# Patient Record
Sex: FEMALE | Race: ASIAN | NOT HISPANIC OR LATINO | URBAN - METROPOLITAN AREA
[De-identification: names, ages, dates, MRNs, and addresses within clinical notes are randomized per-mention and may not be internally consistent; named-entity substitution may affect disease eponyms.]

---

## 2022-11-01 LAB — PAP RECOMMENDATION EXT: NORMAL

## 2023-07-19 NOTE — PROGRESS NOTES
[facility] Progress Note  7/20/2023          Reason for Visit:      had concerns including Establish Care and Annual Exam.       History of Present Illness:     Pt is here today to establish primary care - she recently moved to the area from [place], [place] and was previously followed at [facility] there - records available in care everywhere.    Endometriosis  Pt has had long history of pains/irregular bleeding related to endometriosis.  Had a previous laproscopy and laparatomy for this.  After her pregnancy, had a hormonal IUD but was still having bleeding, so then they added progesterone.  She began to have breast pain and had imaging (MMG/US) showing 2 areas that they wanted follow up on - and so patient made decision to get the IUD out and get off progesterone.  Her gyn in [place] was suggesting hysterectomy, but patient moved here before that was done.  She wasn't entirely sure if she wanted to go that route anyway.  She is interested in getting established with GYN here to discuss and we will put in referral.    Frequent URI  Pt feels that she has been having frequent Upper respiratory infections since she had COVID in May.  She has a child who is 6 and she is often ill with similar symptoms, so its possible it could be related to having a young school-aged child in household.  She is interested in seeing immunologist to have immune workup.    She is actually currently on antibitoics (amoxicillin) after she had sore throat, ear congestion and was diagnosed with Strep at urgent care (she had swab she says).  She is on day 4 of amoxicillin and hasn't had much improvement.  She did not have fever >1004, cervical lymphadenopathy or pus on tonsils she says.  She did not have a cough.  She is taking tylenol prn.    HM  Tdap in 2017 during pregnancy  She had had covid vaccines but has card at home- advised can send us those dates to add to chart.  She had labs done 9/2022 at  "[facility] which were reviewed  PAP smear 10/31/22      Problem List:     Patient Active Problem List   Diagnosis    Endometriosis         Surgical History:     She has a past surgical history that includes laproscopy; Laparotomy (); and  section.      Family History:      Her family history includes Alzheimer's disease in her maternal grandfather; Brain cancer in her father; Diabetes in her paternal grandmother; Hypertension in her mother; Parkinsonism in her maternal grandfather.      Social History:     She reports that she has never smoked. She has never used smokeless tobacco. She reports current alcohol use. She reports that she does not use drugs.      Medications:       Current Outpatient Medications:     amoxicillin (AMOXIL) 500 MG capsule, Take 500 mg by mouth 2 (two) times daily., Disp: , Rfl:     MULTIVITAMIN ORAL, Take by mouth., Disp: , Rfl:     TUMERIC-GING-OLIVE-OREG-CAPRYL ORAL, Take by mouth., Disp: , Rfl:         Allergies:   She is allergic to sulfa (sulfonamide antibiotics).      Review of Systems:     Review of Systems   Constitutional:  Negative for chills and fever.   HENT:  Negative for ear pain and sore throat.    Respiratory:  Negative for cough, shortness of breath and wheezing.    Cardiovascular:  Negative for chest pain and palpitations.   Gastrointestinal:  Negative for constipation, diarrhea, nausea and vomiting.   Genitourinary:  Negative for dysuria and hematuria.   Musculoskeletal:  Negative for joint swelling and joint deformity.   Neurological:  Negative for dizziness and weakness.         Physical Exam:     Temp:             Blood Pressure:  108/64        Pulse:   81     Respirations:     Weight:   57.8 kg (127 lb 6.8 oz)  Height:   5' 2" (1.575 m)  BMI:     Body mass index is 23.31 kg/m².    Physical Exam  Constitutional:       General: She is not in acute distress.  HENT:      Right Ear: Tympanic membrane normal.      Left Ear: Tympanic membrane normal.      Nose: No " congestion or rhinorrhea.      Mouth/Throat:      Pharynx: No oropharyngeal exudate or posterior oropharyngeal erythema.   Cardiovascular:      Rate and Rhythm: Normal rate and regular rhythm.   Pulmonary:      Effort: Pulmonary effort is normal. No respiratory distress.      Breath sounds: No wheezing.   Abdominal:      Palpations: Abdomen is soft.      Tenderness: There is no abdominal tenderness.   Skin:     General: Skin is warm.   Neurological:      General: No focal deficit present.      Mental Status: She is alert and oriented to person, place, and time.         Labs/Imaging/Testing:     Reviewed labs in Care everywhere from [facility] 9/2022      Assessment and Plan:     1. Well adult exam  Will put in routine labs that she can schedule after 9/8/23    2. History of frequent upper respiratory infection  - Ambulatory referral/consult to Immunology; Future  Suspect the positive strep swab may have been unrelated to symptoms as she has not improved on 3-4 days of antibitoics.  She is also have ear congestion symptoms that make me suspect this is more likely a viral rhinitis.  Encouraged nsaids prn for sore throat and expect to be self-limited    3. Endometriosis  - Ambulatory referral/consult to Gynecology; Future    RTC 12 months or sooner prn       Preston Bains MD  7/20/2023    This note was prepared using voice-recognition software.  Although efforts are made to proofread the note, some errors may persist in the final document.

## 2023-07-20 ENCOUNTER — OFFICE VISIT (OUTPATIENT)
Dept: PRIMARY CARE CLINIC | Facility: CLINIC | Age: 38
End: 2023-07-20
Payer: COMMERCIAL

## 2023-07-20 VITALS
OXYGEN SATURATION: 99 % | DIASTOLIC BLOOD PRESSURE: 64 MMHG | HEART RATE: 81 BPM | BODY MASS INDEX: 23.45 KG/M2 | WEIGHT: 127.44 LBS | HEIGHT: 62 IN | SYSTOLIC BLOOD PRESSURE: 108 MMHG

## 2023-07-20 DIAGNOSIS — N80.9 ENDOMETRIOSIS: ICD-10-CM

## 2023-07-20 DIAGNOSIS — Z87.09 HISTORY OF FREQUENT UPPER RESPIRATORY INFECTION: ICD-10-CM

## 2023-07-20 DIAGNOSIS — Z00.00 WELL ADULT EXAM: Primary | ICD-10-CM

## 2023-07-20 PROCEDURE — 3078F PR MOST RECENT DIASTOLIC BLOOD PRESSURE < 80 MM HG: ICD-10-PCS | Mod: CPTII,S$GLB,, | Performed by: INTERNAL MEDICINE

## 2023-07-20 PROCEDURE — 3078F DIAST BP <80 MM HG: CPT | Mod: CPTII,S$GLB,, | Performed by: INTERNAL MEDICINE

## 2023-07-20 PROCEDURE — 3008F PR BODY MASS INDEX (BMI) DOCUMENTED: ICD-10-PCS | Mod: CPTII,S$GLB,, | Performed by: INTERNAL MEDICINE

## 2023-07-20 PROCEDURE — 99385 PREV VISIT NEW AGE 18-39: CPT | Mod: S$GLB,,, | Performed by: INTERNAL MEDICINE

## 2023-07-20 PROCEDURE — 3074F PR MOST RECENT SYSTOLIC BLOOD PRESSURE < 130 MM HG: ICD-10-PCS | Mod: CPTII,S$GLB,, | Performed by: INTERNAL MEDICINE

## 2023-07-20 PROCEDURE — 99999 PR PBB SHADOW E&M-NEW PATIENT-LVL IV: CPT | Mod: PBBFAC,,, | Performed by: INTERNAL MEDICINE

## 2023-07-20 PROCEDURE — 1159F PR MEDICATION LIST DOCUMENTED IN MEDICAL RECORD: ICD-10-PCS | Mod: CPTII,S$GLB,, | Performed by: INTERNAL MEDICINE

## 2023-07-20 PROCEDURE — 3074F SYST BP LT 130 MM HG: CPT | Mod: CPTII,S$GLB,, | Performed by: INTERNAL MEDICINE

## 2023-07-20 PROCEDURE — 1159F MED LIST DOCD IN RCRD: CPT | Mod: CPTII,S$GLB,, | Performed by: INTERNAL MEDICINE

## 2023-07-20 PROCEDURE — 3008F BODY MASS INDEX DOCD: CPT | Mod: CPTII,S$GLB,, | Performed by: INTERNAL MEDICINE

## 2023-07-20 PROCEDURE — 99999 PR PBB SHADOW E&M-NEW PATIENT-LVL IV: ICD-10-PCS | Mod: PBBFAC,,, | Performed by: INTERNAL MEDICINE

## 2023-07-20 PROCEDURE — 99385 PR PREVENTIVE VISIT,NEW,18-39: ICD-10-PCS | Mod: S$GLB,,, | Performed by: INTERNAL MEDICINE

## 2023-07-20 RX ORDER — AMOXICILLIN 500 MG/1
500 CAPSULE ORAL 2 TIMES DAILY
COMMUNITY
Start: 2023-07-15 | End: 2024-04-02

## 2023-07-21 ENCOUNTER — PATIENT OUTREACH (OUTPATIENT)
Dept: ADMINISTRATIVE | Facility: HOSPITAL | Age: 38
End: 2023-07-21
Payer: COMMERCIAL

## 2023-07-26 ENCOUNTER — LAB VISIT (OUTPATIENT)
Dept: LAB | Facility: HOSPITAL | Age: 38
End: 2023-07-26
Payer: COMMERCIAL

## 2023-07-26 ENCOUNTER — OFFICE VISIT (OUTPATIENT)
Dept: ALLERGY | Facility: CLINIC | Age: 38
End: 2023-07-26
Payer: COMMERCIAL

## 2023-07-26 VITALS
SYSTOLIC BLOOD PRESSURE: 106 MMHG | BODY MASS INDEX: 23.39 KG/M2 | WEIGHT: 127.88 LBS | DIASTOLIC BLOOD PRESSURE: 63 MMHG | HEART RATE: 88 BPM

## 2023-07-26 DIAGNOSIS — K21.9 GASTROESOPHAGEAL REFLUX DISEASE, UNSPECIFIED WHETHER ESOPHAGITIS PRESENT: ICD-10-CM

## 2023-07-26 DIAGNOSIS — J31.0 CHRONIC RHINITIS: ICD-10-CM

## 2023-07-26 DIAGNOSIS — Z87.09 HISTORY OF FREQUENT UPPER RESPIRATORY INFECTION: ICD-10-CM

## 2023-07-26 DIAGNOSIS — J31.0 CHRONIC RHINITIS: Primary | ICD-10-CM

## 2023-07-26 LAB
IGA SERPL-MCNC: <5 MG/DL (ref 40–350)
IGE SERPL-ACNC: 50 IU/ML (ref 0–100)
IGG SERPL-MCNC: 2695 MG/DL (ref 650–1600)
IGM SERPL-MCNC: 117 MG/DL (ref 50–300)

## 2023-07-26 PROCEDURE — 82784 ASSAY IGA/IGD/IGG/IGM EACH: CPT | Mod: 59 | Performed by: ALLERGY & IMMUNOLOGY

## 2023-07-26 PROCEDURE — 3074F SYST BP LT 130 MM HG: CPT | Mod: CPTII,S$GLB,, | Performed by: ALLERGY & IMMUNOLOGY

## 2023-07-26 PROCEDURE — 3078F PR MOST RECENT DIASTOLIC BLOOD PRESSURE < 80 MM HG: ICD-10-PCS | Mod: CPTII,S$GLB,, | Performed by: ALLERGY & IMMUNOLOGY

## 2023-07-26 PROCEDURE — 86003 ALLG SPEC IGE CRUDE XTRC EA: CPT | Mod: 59 | Performed by: ALLERGY & IMMUNOLOGY

## 2023-07-26 PROCEDURE — 3008F BODY MASS INDEX DOCD: CPT | Mod: CPTII,S$GLB,, | Performed by: ALLERGY & IMMUNOLOGY

## 2023-07-26 PROCEDURE — 1160F PR REVIEW ALL MEDS BY PRESCRIBER/CLIN PHARMACIST DOCUMENTED: ICD-10-PCS | Mod: CPTII,S$GLB,, | Performed by: ALLERGY & IMMUNOLOGY

## 2023-07-26 PROCEDURE — 82785 ASSAY OF IGE: CPT | Performed by: ALLERGY & IMMUNOLOGY

## 2023-07-26 PROCEDURE — 86317 IMMUNOASSAY INFECTIOUS AGENT: CPT | Mod: 59 | Performed by: ALLERGY & IMMUNOLOGY

## 2023-07-26 PROCEDURE — 86003 ALLG SPEC IGE CRUDE XTRC EA: CPT | Performed by: ALLERGY & IMMUNOLOGY

## 2023-07-26 PROCEDURE — 1159F MED LIST DOCD IN RCRD: CPT | Mod: CPTII,S$GLB,, | Performed by: ALLERGY & IMMUNOLOGY

## 2023-07-26 PROCEDURE — 99999 PR PBB SHADOW E&M-EST. PATIENT-LVL III: CPT | Mod: PBBFAC,,, | Performed by: ALLERGY & IMMUNOLOGY

## 2023-07-26 PROCEDURE — 82784 ASSAY IGA/IGD/IGG/IGM EACH: CPT | Performed by: ALLERGY & IMMUNOLOGY

## 2023-07-26 PROCEDURE — 99999 PR PBB SHADOW E&M-EST. PATIENT-LVL III: ICD-10-PCS | Mod: PBBFAC,,, | Performed by: ALLERGY & IMMUNOLOGY

## 2023-07-26 PROCEDURE — 3074F PR MOST RECENT SYSTOLIC BLOOD PRESSURE < 130 MM HG: ICD-10-PCS | Mod: CPTII,S$GLB,, | Performed by: ALLERGY & IMMUNOLOGY

## 2023-07-26 PROCEDURE — 3078F DIAST BP <80 MM HG: CPT | Mod: CPTII,S$GLB,, | Performed by: ALLERGY & IMMUNOLOGY

## 2023-07-26 PROCEDURE — 99204 OFFICE O/P NEW MOD 45 MIN: CPT | Mod: S$GLB,,, | Performed by: ALLERGY & IMMUNOLOGY

## 2023-07-26 PROCEDURE — 99204 PR OFFICE/OUTPT VISIT, NEW, LEVL IV, 45-59 MIN: ICD-10-PCS | Mod: S$GLB,,, | Performed by: ALLERGY & IMMUNOLOGY

## 2023-07-26 PROCEDURE — 1159F PR MEDICATION LIST DOCUMENTED IN MEDICAL RECORD: ICD-10-PCS | Mod: CPTII,S$GLB,, | Performed by: ALLERGY & IMMUNOLOGY

## 2023-07-26 PROCEDURE — 1160F RVW MEDS BY RX/DR IN RCRD: CPT | Mod: CPTII,S$GLB,, | Performed by: ALLERGY & IMMUNOLOGY

## 2023-07-26 PROCEDURE — 3008F PR BODY MASS INDEX (BMI) DOCUMENTED: ICD-10-PCS | Mod: CPTII,S$GLB,, | Performed by: ALLERGY & IMMUNOLOGY

## 2023-07-26 RX ORDER — AMOXICILLIN 500 MG
1 CAPSULE ORAL DAILY
COMMUNITY

## 2023-07-26 NOTE — PROGRESS NOTES
Suad Prater is referred by Dr. Preston Bains for a consult regarding recurrent upper respiratory infections.  She is here with her daughter Makenzie Calvillo who is 5 years old.      When she was living in [place] in 2020 she saw Dr. Dubon an Allergist at [facility] who did skin testing. She thinks that they were negative.    She had COVID-19 in April 2022.  Since then she has had recurrent upper respiratory infections every several weeks.     She has itching of her ears with fullness, postnasal drip, and clear to yellow mucus. She has been taking Mucinex as needed.  She has also taken as needed antihistamines and Flonase.      She has had multiple courses of antibiotics. She has been [facility] [place].    She has also had a conjunctivitis.    She had a strep throat several weeks ago with a positive culture. She was treated with antibiotics.     She has increased fatigue.     She does have reflux with coffee, caffeine, spicy food, and tomatoes.  She takes Zantac or Pepto-Bismol. She has taken Nexium.  She has increased stomach cramping, nausea, and bloating.    She has endometriosis.     Her daughter sees Dr. Jackson, an allergist for recurrent rhinitis and a food allergy. She is had positive tests for milk and egg.    Her  has asthma.  He has had positive skin tests for juniper, ragweed, and dust mites.  He also is also allergic to peanuts and ibuprofen.    [facility] PEQ ALLERGY QUESTIONNAIRE LONG 7/22/2023   Head or facial pain: No symptoms   Eyes: No symptoms   Do you have difficulty wearing contacts, if applicable?  No   Ears: Itching, Fullness   Do you have ear infections? No   Do you have ear tubes? No   Did you have ear surgery? No   Nose: No symptoms   Did you have a blocked nose? No   Did you have nasal surgery? No   Has your nose ever been broken? No   Throat: Hoarseness, Frequent clearing, Infections, Reflux/heartburn   Sinuses: Sinus pressure or pain, Sinus  infections   Have you had x-rays done for your sinuses? No   Have you had a CT scan done for your sinuses? No   Lungs: No symptoms   Was your last chest x-ray normal or abnormal, if applicable? Normal   Have you ever has a tuberculosis skin test?  Yes   When did you have a tuberculosis skin test? 2018   Was your tuberculosis skin test positive or negative? Negative   Have you ever had a lung-function test? No   Have you had a flu shot this year? Yes   When was your flu shot? Oct 2022   Have you had the pneumonia vaccine?  No   Do you have any known problems with your immune system? No   Do you suspect you may have problems with your immune system? Yes   Do you have frequent infections? Yes   What type of frequent infection(s) do you have? Upper respiratory   Skin: No symptoms   When did these symptoms first occur? June 2022   Are they getting worse or better? Not applicable   How often do these symptoms occur? Every few months   When do these symptoms occur? Adhoc   Do they occur year round? Yes   If there is any seasonal variation in your symptoms, when are they worse? No   Is there a particular time of the day or night when the symptoms are worse? No   Is there anything you have identified, which can cause symptoms or make them worse? (such as dust, grass, plant or animal products, mold, heat, cold, strong odors, exercise) No   Is there anything you have identified, which can make symptoms better?  No   What medications have you tried in the past to help control these symptoms?  Yes - antihistamine ( spray Flonase, allergraD / Claritin D), Mucinex   Please list all the vitamins or herbal medications you are taking. Evening primrose, tumeric, vitamin D   Have you ever seen an allergist for these symptoms? No   Have you ever had skin tests? Yes   When did you have skin tests? 2021   Have you ever had any other type of allergy testing? Yes   When did you have allergy tests?  2021   Have you ever had allergy shots? No    Do you have food allergies? No   Do you have insect allergies? No   Do you have latex allergies? No   Constitution: Fatigue   Cardiovascular: No symptoms   Gastrointestinal: No symptoms   Genital/ urinary: No symptoms   Musculoskeletal: Joint pain   Endocrine: No symptoms   Hematologic: No symptoms   Please note which family members have allergies or asthma and specify which they have. Maternal family - Sulfur allergies   How long have you lived at your current address? 6 months   Has your residence ever had water or flood damage? No   Is there any evidence of mold in the house? No   Does your house have: Central air conditioning, Humidifier   Does your bedroom have: Carpeting, Ceiling fan   What type of pillow do you have, for example feather, foam and fiberfill?  Foam   Do you have pets? No   Does anyone in the house smoke? 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  The continous cycle of upper respiratory infections started post covid infection April 2022. Since then I have had 4 upper respitory infections requiring antibiotics additional rotation of mucinex and decongestants for reoccurring sinus infections and throat   Did you find this questionnaire helpful in addressing your symptoms?  Yes      Physical Examination:  General: Well-developed, well-nourished, no acute distress.  Head: No sinus tenderness.  Eyes: Conjunctivae:  No bulbar or palpebral conjunctival injection.  Ears: EAC's clear.  TM's clear.  No pre-auricular nodes.  Nose: Nasal Mucosa:  Pink.  Septum: No apparent deviation.  Turbinates:  No significant edema.  Polyps/Mass:  None visible.  Teeth/Gums:  No bleeding noted.  Oropharynx: No exudates.  Neck: Supple without thyromegaly. No cervical lymphadenopathy.    Respiratory/Chest: Effort: Good.  Auscultation:  Clear bilaterally.  Cardiovascular:  No murmur, rubs, or gallop heard.   GI:  Non-tender.  No masses.  No  organomegaly.  Extremities:  No cyanosis, clubbing, or edema.  Skin: Good turgor.  No urticaria or angioedema.  Neuro/Psych: Oriented x 3.    Assessment:  1. Chronic rhinitis, consider allergic.  2. Chronic recurrent upper respiratory infections.   3. GERD.  4. COVID-19 infection April 2020.  5. Endometriosis.    Recommendations:  One. Laboratory as ordered.   2. Continue Mucinex as needed.  3. Continue OTC antihistamines as needed.   4. Consider sinus CT with next upper respiratory infection.  5. Consider skin testing off antihistamines if needed.  6. Obtain records from [facility] and the [facility]gent [facility].  7. Return to clinic in two weeks.

## 2023-07-28 LAB
A ALTERNATA IGE QN: <0.1 KU/L
A FUMIGATUS IGE QN: <0.1 KU/L
BERMUDA GRASS IGE QN: <0.1 KU/L
CAT DANDER IGE QN: <0.1 KU/L
CEDAR IGE QN: <0.1 KU/L
D FARINAE IGE QN: <0.1 KU/L
D PTERONYSS IGE QN: <0.1 KU/L
DEPRECATED A ALTERNATA IGE RAST QL: NORMAL
DEPRECATED A FUMIGATUS IGE RAST QL: NORMAL
DEPRECATED BERMUDA GRASS IGE RAST QL: NORMAL
DEPRECATED CAT DANDER IGE RAST QL: NORMAL
DEPRECATED CEDAR IGE RAST QL: NORMAL
DEPRECATED D FARINAE IGE RAST QL: NORMAL
DEPRECATED D PTERONYSS IGE RAST QL: NORMAL
DEPRECATED DOG DANDER IGE RAST QL: NORMAL
DEPRECATED ELDER IGE RAST QL: NORMAL
DEPRECATED ENGL PLANTAIN IGE RAST QL: NORMAL
DEPRECATED PECAN/HICK TREE IGE RAST QL: NORMAL
DEPRECATED ROACH IGE RAST QL: NORMAL
DEPRECATED TIMOTHY IGE RAST QL: NORMAL
DEPRECATED WEST RAGWEED IGE RAST QL: NORMAL
DEPRECATED WHITE OAK IGE RAST QL: NORMAL
DOG DANDER IGE QN: <0.1 KU/L
ELDER IGE QN: <0.1 KU/L
ENGL PLANTAIN IGE QN: <0.1 KU/L
PECAN/HICK TREE IGE QN: <0.1 KU/L
ROACH IGE QN: <0.1 KU/L
TIMOTHY IGE QN: <0.1 KU/L
WEST RAGWEED IGE QN: <0.1 KU/L
WHITE OAK IGE QN: <0.1 KU/L

## 2023-08-01 LAB
DEPRECATED S PNEUM12 IGG SER-MCNC: 0.4 UG/ML
DEPRECATED S PNEUM23 IGG SER-MCNC: 0.4 UG/ML
DEPRECATED S PNEUM4 IGG SER-MCNC: 0.5 UG/ML
DEPRECATED S PNEUM8 IGG SER-MCNC: 1.1 UG/ML
DEPRECATED S PNEUM9 IGG SER-MCNC: 17.4 UG/ML
S PN DA SERO 19F IGG SER-MCNC: 1.8 UG/ML
S PNEUM DA 1 IGG SER-MCNC: 5.3 UG/ML
S PNEUM DA 14 IGG SER-MCNC: 0.8
S PNEUM DA 18C IGG SER-MCNC: <0.3
S PNEUM DA 3 IGG SER-MCNC: <0.3 UG/ML
S PNEUM DA 5 IGG SER-MCNC: 6.9 UG/ML
S PNEUM DA 6B IGG SER-MCNC: 0.6 UG/ML
S PNEUM DA 7F IGG SER-MCNC: 0.9 UG/ML
S PNEUM DA 9V IGG SER-MCNC: <0.3 UG/ML

## 2023-08-06 NOTE — PROGRESS NOTES
throat  [facility] Progress Note  8/10/2023          Reason for Visit:      had concerns including Sore Throat.       History of Present Illness:     Sore throat  At last visit, we discussed a sore throat that patient had already seen urgent care for.  She was swabbed and positive for strep, and had been on amoxicillin but hadn't had improvement.  At the time we speculated that she might be a carrier of strep and it might not be the cause of her symptoms since she didn't have improvement with abx, and she did not have fever or purulent exudate.      She finished the abx and symptoms persisted. She tried naproxen which did help the pain some, but the sore throat persisted.  She has seen A/I for immune workup because of frequent URI - IgA was low and a few of the pneumoccocal serotypes were negative.  She is following up with them later this month to discuss.    She is using antihistamines (allegra) and flonase already - doesn't feel much rhionrrhea.  She does feel that there is phlegm or something in throat that won't come up -    She does have a history of reflux.  We speculated whether this could be LPR.  She is agreeable to do a trial of nexium to see if that helps her sore throat.      Problem List:     Patient Active Problem List   Diagnosis    Endometriosis         Medications:       Current Outpatient Medications:     estradioL (ESTRACE) 0.01 % (0.1 mg/gram) vaginal cream, Place vaginally., Disp: , Rfl:     MULTIVITAMIN ORAL, Take by mouth., Disp: , Rfl:     omega-3 fatty acids/fish oil (FISH OIL-OMEGA-3 FATTY ACIDS) 300-1,000 mg capsule, Take 1 capsule by mouth once daily., Disp: , Rfl:     TUMERIC-GING-OLIVE-OREG-CAPRYL ORAL, Take by mouth., Disp: , Rfl:     amoxicillin (AMOXIL) 500 MG capsule, Take 500 mg by mouth 2 (two) times daily., Disp: , Rfl:         Review of Systems:     Review of Systems   HENT:  Positive for nasal congestion and sore throat. Negative for drooling, ear  "discharge, ear pain and trouble swallowing.    Respiratory:  Negative for cough, shortness of breath and stridor.    Gastrointestinal:  Negative for abdominal pain, diarrhea and vomiting.   Musculoskeletal:  Negative for neck pain.   Neurological:  Negative for headaches.           Physical Exam:     Temp:             Blood Pressure:  110/68        Pulse:   87     Respirations:  18  Weight:   58 kg (127 lb 13.9 oz)  Height:   5' 2" (1.575 m)  BMI:     Body mass index is 23.39 kg/m².    Physical Exam  Constitutional:       General: She is not in acute distress.  HENT:      Right Ear: Tympanic membrane normal.      Left Ear: Tympanic membrane normal.      Nose: No congestion or rhinorrhea.      Mouth/Throat:      Pharynx: No oropharyngeal exudate or posterior oropharyngeal erythema.   Cardiovascular:      Rate and Rhythm: Normal rate and regular rhythm.   Pulmonary:      Effort: Pulmonary effort is normal. No respiratory distress.      Breath sounds: No wheezing.   Abdominal:      Palpations: Abdomen is soft.      Tenderness: There is no abdominal tenderness.   Lymphadenopathy:      Cervical: No cervical adenopathy.   Skin:     General: Skin is warm.   Neurological:      General: No focal deficit present.      Mental Status: She is alert and oriented to person, place, and time.           Labs/Imaging/Testing:     [id number]  No visits with results within 1 Day(s) from this visit.   Latest known visit with results is:   Lab Visit on 07/26/2023   Component Date Value    D. farinae 07/26/2023 <0.10     D. farinae Class 07/26/2023 CLASS 0     Mite Dust Pteronyssinus * 07/26/2023 <0.10     D. pteronyssinus Class 07/26/2023 CLASS 0     [place] Grass 07/26/2023 <0.10     [place] Grass Class 07/26/2023 CLASS 0     David Grass 07/26/2023 <0.10     David Grass Class 07/26/2023 CLASS 0     [place] IgE 07/26/2023 <0.10     [place] Class 07/26/2023 CLASS 0     Plantain 07/26/2023 <0.10     English Plantain Class 07/26/2023 CLASS 0  "    White Oak(Quercus alba) * 07/26/2023 <0.10     Council, Class 07/26/2023 CLASS 0     Pecan [place] Tree 07/26/2023 <0.10     Pecan, Class 07/26/2023 CLASS 0     [place] IgE 07/26/2023 <0.10     [place]er Class 07/26/2023 CLASS 0     Ragweed, Western IgE 07/26/2023 <0.10     Ragweed, Western, Class 07/26/2023 CLASS 0     Alternaria alternata 07/26/2023 <0.10     Altern. alternata Class 07/26/2023 CLASS 0     Aspergillus Fumigatus IgE 07/26/2023 <0.10     A. fumigatus Class 07/26/2023 CLASS 0     Cat Dander 07/26/2023 <0.10     Cat Epithelium Class 07/26/2023 CLASS 0     Cockroach, IgE 07/26/2023 <0.10     Cockroach, IgE 07/26/2023 CLASS 0     Dog Dander, IgE 07/26/2023 <0.10     Dog Dander Class 07/26/2023 CLASS 0     IgA 07/26/2023 <5 (L)     IgE 07/26/2023 50     IgG 07/26/2023 2695 (H)     IgM 07/26/2023 117     S.pneumoniae Type 1 07/26/2023 5.3     S.pneumoniae Type 3 07/26/2023 <0.3     Strep pneumo Type 4 07/26/2023 0.5     S.pneumoniae Type 5 07/26/2023 6.9     S.pneumoniae Type 8 07/26/2023 1.1     S.pneumoniae Type 9N 07/26/2023 17.4     S.pneumoniae Type 12F 07/26/2023 0.4     Strep pneumo Type 14 07/26/2023 0.8     S.pneumoniae Type 19F 07/26/2023 1.8     S.pneumoniae Type 23F 07/26/2023 0.4     S.pneumoniae Type 6B 07/26/2023 0.6     S.pneumoniae Type 7F 07/26/2023 0.9     S.pneumoniae Type 18C 07/26/2023 <0.3     S.pneumoniae Type 9V Abs 07/26/2023 <0.3            Assessment and Plan:     1. Sore throat  - CULTURE, RESPIRATORY  - THROAT   Will to a throat culture - but I doubt strep based on exam and duration of symptoms and failure to improve on amoxicillin    Try nexium for possible LPR.    Keep follow up with allergist/immunologist to discuss the low IgA.       Preston Bains MD  8/10/2023    This note was prepared using voice-recognition software.  Although efforts are made to proofread the note, some errors may persist in the final document.

## 2023-08-07 ENCOUNTER — TELEPHONE (OUTPATIENT)
Dept: ALLERGY | Facility: CLINIC | Age: 38
End: 2023-08-07
Payer: COMMERCIAL

## 2023-08-07 NOTE — TELEPHONE ENCOUNTER
----- Message from [place] sent at 8/7/2023  3:18 PM CDT -----  Regarding: Missed Call  Contact: pt [phone]  Pt is calling in ref to missed call from Rachelle . She has an appt scheduled on 8/9. Patient Requesting Call Back @ [phone]

## 2023-08-10 ENCOUNTER — OFFICE VISIT (OUTPATIENT)
Dept: PRIMARY CARE CLINIC | Facility: CLINIC | Age: 38
End: 2023-08-10
Payer: COMMERCIAL

## 2023-08-10 VITALS
RESPIRATION RATE: 18 BRPM | BODY MASS INDEX: 23.53 KG/M2 | SYSTOLIC BLOOD PRESSURE: 110 MMHG | HEART RATE: 87 BPM | WEIGHT: 127.88 LBS | HEIGHT: 62 IN | OXYGEN SATURATION: 99 % | DIASTOLIC BLOOD PRESSURE: 68 MMHG

## 2023-08-10 DIAGNOSIS — J02.9 SORE THROAT: Primary | ICD-10-CM

## 2023-08-10 PROCEDURE — 3078F DIAST BP <80 MM HG: CPT | Mod: CPTII,S$GLB,, | Performed by: INTERNAL MEDICINE

## 2023-08-10 PROCEDURE — 1159F PR MEDICATION LIST DOCUMENTED IN MEDICAL RECORD: ICD-10-PCS | Mod: CPTII,S$GLB,, | Performed by: INTERNAL MEDICINE

## 2023-08-10 PROCEDURE — 3074F SYST BP LT 130 MM HG: CPT | Mod: CPTII,S$GLB,, | Performed by: INTERNAL MEDICINE

## 2023-08-10 PROCEDURE — 3008F BODY MASS INDEX DOCD: CPT | Mod: CPTII,S$GLB,, | Performed by: INTERNAL MEDICINE

## 2023-08-10 PROCEDURE — 3008F PR BODY MASS INDEX (BMI) DOCUMENTED: ICD-10-PCS | Mod: CPTII,S$GLB,, | Performed by: INTERNAL MEDICINE

## 2023-08-10 PROCEDURE — 87077 CULTURE AEROBIC IDENTIFY: CPT | Mod: 59 | Performed by: INTERNAL MEDICINE

## 2023-08-10 PROCEDURE — 87070 CULTURE OTHR SPECIMN AEROBIC: CPT | Performed by: INTERNAL MEDICINE

## 2023-08-10 PROCEDURE — 99999 PR PBB SHADOW E&M-EST. PATIENT-LVL III: ICD-10-PCS | Mod: PBBFAC,,, | Performed by: INTERNAL MEDICINE

## 2023-08-10 PROCEDURE — 99214 OFFICE O/P EST MOD 30 MIN: CPT | Mod: S$GLB,,, | Performed by: INTERNAL MEDICINE

## 2023-08-10 PROCEDURE — 99999 PR PBB SHADOW E&M-EST. PATIENT-LVL III: CPT | Mod: PBBFAC,,, | Performed by: INTERNAL MEDICINE

## 2023-08-10 PROCEDURE — 3078F PR MOST RECENT DIASTOLIC BLOOD PRESSURE < 80 MM HG: ICD-10-PCS | Mod: CPTII,S$GLB,, | Performed by: INTERNAL MEDICINE

## 2023-08-10 PROCEDURE — 1159F MED LIST DOCD IN RCRD: CPT | Mod: CPTII,S$GLB,, | Performed by: INTERNAL MEDICINE

## 2023-08-10 PROCEDURE — 3074F PR MOST RECENT SYSTOLIC BLOOD PRESSURE < 130 MM HG: ICD-10-PCS | Mod: CPTII,S$GLB,, | Performed by: INTERNAL MEDICINE

## 2023-08-10 PROCEDURE — 87186 SC STD MICRODIL/AGAR DIL: CPT | Performed by: INTERNAL MEDICINE

## 2023-08-10 PROCEDURE — 99214 PR OFFICE/OUTPT VISIT, EST, LEVL IV, 30-39 MIN: ICD-10-PCS | Mod: S$GLB,,, | Performed by: INTERNAL MEDICINE

## 2023-08-10 RX ORDER — ESTRADIOL 0.1 MG/G
CREAM VAGINAL
COMMUNITY
Start: 2023-08-08 | End: 2024-04-02

## 2023-08-14 ENCOUNTER — PATIENT MESSAGE (OUTPATIENT)
Dept: PRIMARY CARE CLINIC | Facility: CLINIC | Age: 38
End: 2023-08-14
Payer: COMMERCIAL

## 2023-08-14 LAB
BACTERIA THROAT CULT: ABNORMAL
BACTERIA THROAT CULT: ABNORMAL

## 2023-08-14 RX ORDER — DOXYCYCLINE HYCLATE 100 MG
100 TABLET ORAL 2 TIMES DAILY
Qty: 14 TABLET | Refills: 0 | Status: SHIPPED | OUTPATIENT
Start: 2023-08-14 | End: 2024-04-02

## 2023-08-21 ENCOUNTER — TELEPHONE (OUTPATIENT)
Dept: OBSTETRICS AND GYNECOLOGY | Facility: CLINIC | Age: 38
End: 2023-08-21
Payer: COMMERCIAL

## 2023-08-21 ENCOUNTER — PATIENT MESSAGE (OUTPATIENT)
Dept: OBSTETRICS AND GYNECOLOGY | Facility: CLINIC | Age: 38
End: 2023-08-21
Payer: COMMERCIAL

## 2023-08-28 ENCOUNTER — OFFICE VISIT (OUTPATIENT)
Dept: ALLERGY | Facility: CLINIC | Age: 38
End: 2023-08-28
Payer: COMMERCIAL

## 2023-08-28 VITALS
SYSTOLIC BLOOD PRESSURE: 113 MMHG | WEIGHT: 130.06 LBS | BODY MASS INDEX: 23.79 KG/M2 | HEART RATE: 73 BPM | DIASTOLIC BLOOD PRESSURE: 64 MMHG

## 2023-08-28 DIAGNOSIS — D80.2 IGA DEFICIENCY: ICD-10-CM

## 2023-08-28 DIAGNOSIS — K21.9 GASTROESOPHAGEAL REFLUX DISEASE, UNSPECIFIED WHETHER ESOPHAGITIS PRESENT: ICD-10-CM

## 2023-08-28 DIAGNOSIS — J31.0 CHRONIC RHINITIS: ICD-10-CM

## 2023-08-28 DIAGNOSIS — H10.9 CONJUNCTIVITIS OF RIGHT EYE, UNSPECIFIED CONJUNCTIVITIS TYPE: Primary | ICD-10-CM

## 2023-08-28 DIAGNOSIS — Z86.19 FREQUENT INFECTIONS: ICD-10-CM

## 2023-08-28 PROCEDURE — 1160F PR REVIEW ALL MEDS BY PRESCRIBER/CLIN PHARMACIST DOCUMENTED: ICD-10-PCS | Mod: CPTII,S$GLB,, | Performed by: ALLERGY & IMMUNOLOGY

## 2023-08-28 PROCEDURE — 3078F PR MOST RECENT DIASTOLIC BLOOD PRESSURE < 80 MM HG: ICD-10-PCS | Mod: CPTII,S$GLB,, | Performed by: ALLERGY & IMMUNOLOGY

## 2023-08-28 PROCEDURE — 95004 PERQ TESTS W/ALRGNC XTRCS: CPT | Mod: S$GLB,,, | Performed by: ALLERGY & IMMUNOLOGY

## 2023-08-28 PROCEDURE — 99999 PR PBB SHADOW E&M-EST. PATIENT-LVL III: ICD-10-PCS | Mod: PBBFAC,,, | Performed by: ALLERGY & IMMUNOLOGY

## 2023-08-28 PROCEDURE — 90732 PPSV23 VACC 2 YRS+ SUBQ/IM: CPT | Mod: S$GLB,,, | Performed by: ALLERGY & IMMUNOLOGY

## 2023-08-28 PROCEDURE — 3074F SYST BP LT 130 MM HG: CPT | Mod: CPTII,S$GLB,, | Performed by: ALLERGY & IMMUNOLOGY

## 2023-08-28 PROCEDURE — 99214 PR OFFICE/OUTPT VISIT, EST, LEVL IV, 30-39 MIN: ICD-10-PCS | Mod: 25,S$GLB,, | Performed by: ALLERGY & IMMUNOLOGY

## 2023-08-28 PROCEDURE — 90471 IMMUNIZATION ADMIN: CPT | Mod: S$GLB,,, | Performed by: ALLERGY & IMMUNOLOGY

## 2023-08-28 PROCEDURE — 99999 PR PBB SHADOW E&M-EST. PATIENT-LVL III: CPT | Mod: PBBFAC,,, | Performed by: ALLERGY & IMMUNOLOGY

## 2023-08-28 PROCEDURE — 95004 PR ALLERGY SKIN TESTS,ALLERGENS: ICD-10-PCS | Mod: S$GLB,,, | Performed by: ALLERGY & IMMUNOLOGY

## 2023-08-28 PROCEDURE — 1159F PR MEDICATION LIST DOCUMENTED IN MEDICAL RECORD: ICD-10-PCS | Mod: CPTII,S$GLB,, | Performed by: ALLERGY & IMMUNOLOGY

## 2023-08-28 PROCEDURE — 90732 PNEUMOCOCCAL POLYSACCHARIDE VACCINE 23-VALENT =>2YO SQ IM: ICD-10-PCS | Mod: S$GLB,,, | Performed by: ALLERGY & IMMUNOLOGY

## 2023-08-28 PROCEDURE — 1159F MED LIST DOCD IN RCRD: CPT | Mod: CPTII,S$GLB,, | Performed by: ALLERGY & IMMUNOLOGY

## 2023-08-28 PROCEDURE — 3078F DIAST BP <80 MM HG: CPT | Mod: CPTII,S$GLB,, | Performed by: ALLERGY & IMMUNOLOGY

## 2023-08-28 PROCEDURE — 99214 OFFICE O/P EST MOD 30 MIN: CPT | Mod: 25,S$GLB,, | Performed by: ALLERGY & IMMUNOLOGY

## 2023-08-28 PROCEDURE — 3008F PR BODY MASS INDEX (BMI) DOCUMENTED: ICD-10-PCS | Mod: CPTII,S$GLB,, | Performed by: ALLERGY & IMMUNOLOGY

## 2023-08-28 PROCEDURE — 1160F RVW MEDS BY RX/DR IN RCRD: CPT | Mod: CPTII,S$GLB,, | Performed by: ALLERGY & IMMUNOLOGY

## 2023-08-28 PROCEDURE — 3074F PR MOST RECENT SYSTOLIC BLOOD PRESSURE < 130 MM HG: ICD-10-PCS | Mod: CPTII,S$GLB,, | Performed by: ALLERGY & IMMUNOLOGY

## 2023-08-28 PROCEDURE — 3008F BODY MASS INDEX DOCD: CPT | Mod: CPTII,S$GLB,, | Performed by: ALLERGY & IMMUNOLOGY

## 2023-08-28 PROCEDURE — 90471 PNEUMOCOCCAL POLYSACCHARIDE VACCINE 23-VALENT =>2YO SQ IM: ICD-10-PCS | Mod: S$GLB,,, | Performed by: ALLERGY & IMMUNOLOGY

## 2023-08-28 NOTE — PROGRESS NOTES
Suad Prater returns to clinic today for continued evaluation of recurrent upper respiratory infections. She is here alone.  She was last seen 2023.    After her last visit, her daughter got pink dye.  Her  then got it. Then she got it.  She was seen in an urgent care center and given eyedrops with improvement.     This morning she woke up with another red eye on the right.    She has not had any other upper respiratory infections.     She has had strep infections in the past.    She continues to have indigestion and heartburn.        8/3/2023     9:46 AM   [facility] PEQ ALLERGY QUESTIONNAIRE SHORT   facial swelling No   Sinus pain? No   sinus pressure  No   ear discharge No   ear pain Yes   hearing loss No   nosebleeds No   postnasal drip No   sneezing No   runny nose Yes   congestion No   sore throat Yes   trouble swallowing No   voice change Yes   eye itching No   eye redness Yes   eye discharge Yes   eye pain No   Light sensitivity / light hurts the eyes? No   cough Yes   wheezing No   shortness of breath No   apnea No   choking No   chest tightness Yes   rash No   color change  No     Physical Examination:  General: Well-developed, well-nourished, no acute distress.  Head: No sinus tenderness.  Eyes: Conjunctivae:  Right conjunctiva injected.  Ears: EAC's clear.  TM's clear.  No pre-auricular nodes.  Nose: Nasal Mucosa:  Pink.  Septum: No apparent deviation.  Turbinates:  No significant edema.  Polyps/Mass:  None visible.  Teeth/Gums:  No bleeding noted.  Oropharynx: No exudates.  Neck: Supple without thyromegaly. No cervical lymphadenopathy.    Respiratory/Chest: Effort: Good.  Auscultation:  Clear bilaterally.  Skin: Good turgor.  No urticaria or angioedema.  Neuro/Psych: Oriented x 3.    Laboratory 2023:  IgE level:  50.   ImmunoCAP:  Negative.   IgA:  Less than 5.   Ig.   IgM:  117.   Pneumococcal titers:  Not protective.    Inhalant skin tests 2023: 3+ histamine control. All  tests are negative.    Assessment:  1. Chronic rhinitis, not allergic.  2. Chronic recurrent upper respiratory infections with low pneumococcal titer.  3. GERD.  4. COVID-19 infection April 2020.  5. Endometriosis.  6. IgA deficiency.   7. Acute right conjunctivitis.    Recommendations:  1. Pneumovax today.   2. Recheck titers in six weeks.   3. Consider sinus CT with next upper respiratory infection.  4. Optometry evaluation.  5. Records from [place] and the Urgent Care Center have not yet been received.  6. Return to clinic with next infection.    Patient education August 28, 2023:  Counseled on IgA deficiency.    I spent a total of 35 minutes on the day of the visit.This includes face to face time and non-face to face time preparing to see the patient (eg, review of tests), obtaining and/or reviewing separately obtained history, documenting clinical information in the electronic or other health record, independently interpreting results and communicating results to the patient/family/caregiver, or care coordinator. 35

## 2023-09-08 ENCOUNTER — OFFICE VISIT (OUTPATIENT)
Dept: OPTOMETRY | Facility: CLINIC | Age: 38
End: 2023-09-08
Payer: COMMERCIAL

## 2023-09-08 DIAGNOSIS — H10.9 CONJUNCTIVITIS OF RIGHT EYE, UNSPECIFIED CONJUNCTIVITIS TYPE: ICD-10-CM

## 2023-09-08 DIAGNOSIS — H16.223 KERATOCONJUNCTIVITIS SICCA NOT SPECIFIED AS SJOGREN'S, BILATERAL: Primary | ICD-10-CM

## 2023-09-08 PROCEDURE — 92012 PR EYE EXAM, EST PATIENT,INTERMED: ICD-10-PCS | Mod: S$GLB,,, | Performed by: OPTOMETRIST

## 2023-09-08 PROCEDURE — 1159F PR MEDICATION LIST DOCUMENTED IN MEDICAL RECORD: ICD-10-PCS | Mod: CPTII,S$GLB,, | Performed by: OPTOMETRIST

## 2023-09-08 PROCEDURE — 92012 INTRM OPH EXAM EST PATIENT: CPT | Mod: S$GLB,,, | Performed by: OPTOMETRIST

## 2023-09-08 PROCEDURE — 1159F MED LIST DOCD IN RCRD: CPT | Mod: CPTII,S$GLB,, | Performed by: OPTOMETRIST

## 2023-09-08 PROCEDURE — 99999 PR PBB SHADOW E&M-EST. PATIENT-LVL III: ICD-10-PCS | Mod: PBBFAC,,, | Performed by: OPTOMETRIST

## 2023-09-08 PROCEDURE — 99999 PR PBB SHADOW E&M-EST. PATIENT-LVL III: CPT | Mod: PBBFAC,,, | Performed by: OPTOMETRIST

## 2023-09-08 RX ORDER — FLUOROMETHOLONE 1 MG/ML
1 SUSPENSION/ DROPS OPHTHALMIC 4 TIMES DAILY
Qty: 5 ML | Refills: 0 | Status: SHIPPED | OUTPATIENT
Start: 2023-09-08 | End: 2023-09-13 | Stop reason: RX

## 2023-09-08 NOTE — PROGRESS NOTES
HPI    Pt is here today due to conjunctivitis OU. Pt states that the recurring   conjunctivitis started the last week of July with her daughter having it.   She states that she got it next and went to urgent care where she was   prescribed drops. She states that she used those drops for another 10 days   which cleared it up. She states that it happened again twice after the   first time and that she used the drops each time to help which cleared it   up temporarily. She states that she replaced all of her makeup products   and contacts. No itching. No pain. Happens OU, but more so OS than OD.     Eye Meds: Ciplox-D BID  Last edited by Dilma Alicia, OD on 9/8/2023  4:05 PM.            Assessment /Plan     For exam results, see Encounter Report.    Keratoconjunctivitis sicca not specified as Sjogren's, bilateral    Conjunctivitis of right eye, unspecified conjunctivitis type  -     Ambulatory referral/consult to Optometry    Other orders  -     fluorometholone 0.1% (FML) 0.1 % DrpS; Place 1 drop into both eyes 4 (four) times daily. for 7 days  Dispense: 5 mL; Refill: 0      Educated pt on findings. Dry eyes OD>OS today. Rx FML 1 gtt OU BID for 7 days then d/c use. After d/c FML, start ATs QID + add belle/gel QHS. Also recommend CL holiday for 1 week until Fml is stopped. D/c ciplox-D gtt. Discussed may need to switch from biweekly Cls to dailies. If symptoms worsen or don't improve, RTC.       RTC with any worsening.     Addendum 09/13/2023  FML gtt out of stock. Will try lotemax instead. Rx lotemax 1 gtt OU QID for 1 week then d/c use. After stopping lotemax, start ATs QID + add belle/gel QHS.

## 2023-09-13 ENCOUNTER — PATIENT MESSAGE (OUTPATIENT)
Dept: OPTOMETRY | Facility: CLINIC | Age: 38
End: 2023-09-13
Payer: COMMERCIAL

## 2023-09-13 RX ORDER — LOTEPREDNOL ETABONATE 5 MG/ML
1 SUSPENSION/ DROPS OPHTHALMIC 4 TIMES DAILY
Qty: 5 ML | Refills: 0 | Status: SHIPPED | OUTPATIENT
Start: 2023-09-13 | End: 2023-09-20

## 2023-09-25 ENCOUNTER — LAB VISIT (OUTPATIENT)
Dept: LAB | Facility: HOSPITAL | Age: 38
End: 2023-09-25
Attending: ALLERGY & IMMUNOLOGY
Payer: COMMERCIAL

## 2023-09-25 DIAGNOSIS — Z86.19 FREQUENT INFECTIONS: ICD-10-CM

## 2023-09-25 PROCEDURE — 86317 IMMUNOASSAY INFECTIOUS AGENT: CPT | Performed by: ALLERGY & IMMUNOLOGY

## 2023-09-25 PROCEDURE — 36415 COLL VENOUS BLD VENIPUNCTURE: CPT | Performed by: ALLERGY & IMMUNOLOGY

## 2023-10-03 LAB
DEPRECATED S PNEUM12 IGG SER-MCNC: >30 UG/ML
DEPRECATED S PNEUM23 IGG SER-MCNC: 1.9 UG/ML
DEPRECATED S PNEUM4 IGG SER-MCNC: >28 UG/ML
DEPRECATED S PNEUM8 IGG SER-MCNC: >86 UG/ML
DEPRECATED S PNEUM9 IGG SER-MCNC: 15.4 UG/ML
S PN DA SERO 19F IGG SER-MCNC: 74.2 UG/ML
S PNEUM DA 1 IGG SER-MCNC: 83.5 UG/ML
S PNEUM DA 14 IGG SER-MCNC: 172.6
S PNEUM DA 18C IGG SER-MCNC: 1.6
S PNEUM DA 3 IGG SER-MCNC: 2.7 UG/ML
S PNEUM DA 5 IGG SER-MCNC: >154 UG/ML
S PNEUM DA 6B IGG SER-MCNC: 32.4 UG/ML
S PNEUM DA 7F IGG SER-MCNC: 12.8 UG/ML
S PNEUM DA 9V IGG SER-MCNC: 2.3 UG/ML

## 2023-11-15 DIAGNOSIS — Z12.31 OTHER SCREENING MAMMOGRAM: ICD-10-CM

## 2023-11-17 DIAGNOSIS — Z12.31 SCREENING MAMMOGRAM FOR BREAST CANCER: Primary | ICD-10-CM

## 2023-11-30 ENCOUNTER — HOSPITAL ENCOUNTER (OUTPATIENT)
Dept: RADIOLOGY | Facility: HOSPITAL | Age: 38
Discharge: HOME | End: 2023-11-30
Attending: INTERNAL MEDICINE
Payer: COMMERCIAL

## 2023-11-30 VITALS — BODY MASS INDEX: 23.78 KG/M2 | WEIGHT: 130 LBS

## 2023-11-30 DIAGNOSIS — Z12.31 OTHER SCREENING MAMMOGRAM: ICD-10-CM

## 2023-11-30 PROCEDURE — 77067 MAMMO DIGITAL SCREENING BILAT WITH TOMO: ICD-10-PCS | Mod: 26,,, | Performed by: RADIOLOGY

## 2023-11-30 PROCEDURE — 77063 MAMMO DIGITAL SCREENING BILAT WITH TOMO: ICD-10-PCS | Mod: 26,,, | Performed by: RADIOLOGY

## 2023-11-30 PROCEDURE — 77067 SCR MAMMO BI INCL CAD: CPT | Mod: TC

## 2023-11-30 PROCEDURE — 77067 SCR MAMMO BI INCL CAD: CPT | Mod: 26,,, | Performed by: RADIOLOGY

## 2023-11-30 PROCEDURE — 77063 BREAST TOMOSYNTHESIS BI: CPT | Mod: 26,,, | Performed by: RADIOLOGY

## 2024-04-02 ENCOUNTER — TELEPHONE (OUTPATIENT)
Dept: PRIMARY CARE CLINIC | Facility: CLINIC | Age: 39
End: 2024-04-02

## 2024-04-02 ENCOUNTER — OFFICE VISIT (OUTPATIENT)
Dept: PRIMARY CARE CLINIC | Facility: CLINIC | Age: 39
End: 2024-04-02
Payer: COMMERCIAL

## 2024-04-02 VITALS
HEIGHT: 62 IN | SYSTOLIC BLOOD PRESSURE: 118 MMHG | DIASTOLIC BLOOD PRESSURE: 82 MMHG | BODY MASS INDEX: 24.01 KG/M2 | WEIGHT: 130.5 LBS | OXYGEN SATURATION: 99 % | HEART RATE: 84 BPM

## 2024-04-02 DIAGNOSIS — M79.18 PAIN OF LEFT DELTOID: Primary | ICD-10-CM

## 2024-04-02 DIAGNOSIS — R05.8 POST-VIRAL COUGH SYNDROME: ICD-10-CM

## 2024-04-02 PROCEDURE — 1159F MED LIST DOCD IN RCRD: CPT | Mod: CPTII,S$GLB,, | Performed by: INTERNAL MEDICINE

## 2024-04-02 PROCEDURE — 3079F DIAST BP 80-89 MM HG: CPT | Mod: CPTII,S$GLB,, | Performed by: INTERNAL MEDICINE

## 2024-04-02 PROCEDURE — 3008F BODY MASS INDEX DOCD: CPT | Mod: CPTII,S$GLB,, | Performed by: INTERNAL MEDICINE

## 2024-04-02 PROCEDURE — 99214 OFFICE O/P EST MOD 30 MIN: CPT | Mod: S$GLB,,, | Performed by: INTERNAL MEDICINE

## 2024-04-02 PROCEDURE — 3074F SYST BP LT 130 MM HG: CPT | Mod: CPTII,S$GLB,, | Performed by: INTERNAL MEDICINE

## 2024-04-02 PROCEDURE — 99999 PR PBB SHADOW E&M-EST. PATIENT-LVL III: CPT | Mod: PBBFAC,,, | Performed by: INTERNAL MEDICINE

## 2024-04-02 RX ORDER — TAZAROTENE 0.45 MG/G
LOTION TOPICAL
COMMUNITY
Start: 2024-01-04

## 2024-04-02 RX ORDER — CEFDINIR 300 MG/1
300 CAPSULE ORAL EVERY 12 HOURS
COMMUNITY
Start: 2024-03-26

## 2024-04-02 RX ORDER — SPIRONOLACTONE 100 MG/1
100 TABLET, FILM COATED ORAL
COMMUNITY
Start: 2023-11-28

## 2024-04-02 NOTE — PROGRESS NOTES
[facility] Progress Note      Chief Complaint     Abscess (On arm inside left arm)   Lump on left deltoid x  8 mo    Subjective:      History of Present Illness:  Suad Prater is a 38 y.o. female     C/o lump x 8 mo on left deltoid approx 3/10  pain at its worst but pt admits to lying on left side while sleeping that exacerbates the pain. She did take Motrin 200mg daily x 3 days at night which did improve the pain. Denies trauma and has FROM but feels tense when lifting above the horizontal.     Post-viral cough x 4-6 wks: her daughter had gotten sick 4 wks ago and pt states she is intermittently coughing but is otherwise afebrile and feels well without fever, chills, N/V, decreased appetite or weight loss and can do all ADLs.     Past Medical History:  No past medical history on file.    Past Surgical History:  Past Surgical History:   Procedure Laterality Date     SECTION      LAPAROTOMY      endometriosis    laproscopy      endometriosis        Allergies:  Review of patient's allergies indicates:   Allergen Reactions    Sulfa (sulfonamide antibiotics) Rash and Swelling       Home Medications:  Current Outpatient Medications   Medication Sig Dispense Refill    ARAZLO 0.045 % Lotn SMARTSIG:Sparingly Topical Every Night      cefdinir (OMNICEF) 300 MG capsule Take 300 mg by mouth every 12 (twelve) hours.      MULTIVITAMIN ORAL Take by mouth.      omega-3 fatty acids/fish oil (FISH OIL-OMEGA-3 FATTY ACIDS) 300-1,000 mg capsule Take 1 capsule by mouth once daily.      spironolactone (ALDACTONE) 100 MG tablet Take 100 mg by mouth.      TUMERIC-GING-OLIVE-OREG-CAPRYL ORAL Take by mouth.       No current facility-administered medications for this visit.        Family History:  Family History   Problem Relation Age of Onset    Hypertension Mother     Brain cancer Father     Alzheimer's disease Maternal Grandfather     Parkinsonism Maternal Grandfather     Diabetes  "Paternal Grandmother        Social History:  Social History     Tobacco Use    Smoking status: Never    Smokeless tobacco: Never   Substance Use Topics    Alcohol use: Yes     Comment: 2-3 drinks per week    Drug use: Never       Review of Systems:  Pertinent positives and negatives listed in HPI. All other systems are reviewed and are negative.    Health Maintaince :   Health Maintenance Topics with due status: Not Due       Topic Last Completion Date    Cervical Cancer Screening 11/01/2022    Pneumococcal Vaccines (Age 0-64) 08/28/2023    Mammogram 11/30/2023           Eye:   Dental:     Immunizations:     The ASCVD Risk score (Camelia ROQUE, et al., 2019) failed to calculate for the following reasons:    Cannot find a previous HDL lab    Cannot find a previous total cholesterol lab      Objective:   /82 (BP Location: Right arm, Patient Position: Sitting, BP Method: Medium (Manual))   Pulse 84   Ht 5' 2" (1.575 m)   Wt 59.2 kg (130 lb 8.2 oz)   LMP 03/31/2024   SpO2 99%   BMI 23.87 kg/m²      Body mass index is 23.87 kg/m².       Physical Examination:  General: Alert and awake in no apparent distress  Neck:   Cervical nodes not enlarged; supple; no bruits  Cardio:  Regular rate and rhythm with normal S1 and S2; no murmurs or rubs  Resp:  CTAB; respirations unlabored; no wheezes, crackles or rhonchi; no cough with deep inspiration   Abdom: Soft, NTND with normoactive bowel sounds; negative HSM  Extrem: Warm and well-perfused with no clubbing, cyanosis or edema; left prox deltoid slightly tender,  more prominent without fluctuance; no palpable discreet mass, no overlying erythema, but  normal ROM and strength; no axillary lymph nodes   Neuro:  AAOx3; cooperative and pleasant with no focal deficits    Laboratory:      Most Recent Data:  No results found for: "WBC", "HGB", "HCT", "PLT", "CHOL", "TRIG", "HDL", "LDLDIRECT", "ALT", "AST", "NA", "K", "CL", "BUN", "CO2", "TSH", "PSA", "INR", "GLUF", "HGBA1C", " ""MICROALBUR"           CBC: No results found for: "WBC", "HGB", "HCT", "PLT", "MCV", "RDW"  BMP: No results found for: "NA", "K", "CL", "CO2", "BUN", "CREATININE", "GLU", "CALCIUM", "MG", "PHOS"  LFTs: No results found for: "PROT", "ALBUMIN", "BILITOT", "AST", "ALKPHOS", "ALT", "GGT"  Coags: No results found for: "INR", "PROTIME", "PTT"  FLP:    No results found for: "CHOL"  No results found for: "HDL"  No results found for: "LDLCALC"  No results found for: "TRIG"  No results found for: "CHOLHDL"   DM:    No results found for: "HGBA1C", "GLUF", "MICROALBUR", "LDLCALC", "CREATININE"  Thyroid: No results found for: "TSH", "FREET4", "F3SDNWF", "Q4FDOHV", "THYROIDAB"  Anemia: No results found for: "IRON", "TIBC", "FERRITIN", "FAGTAMKO55", "FOLATE"  Cardiac: No results found for: "TROPONINI", "CKTOTAL", "CKMB", "BNP"  Urinalysis: No results found for: "LABURIN", "COLORU", "PHUA", "CLARITYU", "SPECGRAV", "LABSPEC", "NITRITE", "PROTEINUR", "GLUCOSEU", "KETONESU", "UROBILINOGEN", "BILIRUBINUR", "BLOODU", "RBCU", "WBCUA"    Other Results:  EKG (my interpretation):     Radiology:  Mammo Digital Screening Bilat w/ Jesus  Narrative: Result:  Mammo Digital Screening Bilat w/ Jesus    History:  Patient is 38 y.o. and is seen for a screening mammogram.    Films Compared:  Prior images (if available) were compared.     Findings:  This procedure was performed using tomosynthesis.   Computer-aided detection was utilized in the interpretation of this   examination.    The breasts are heterogeneously dense, which may obscure small masses.   There is no evidence of suspicious masses, microcalcifications or   architectural distortion.  Impression:    No mammographic evidence of malignancy.    BI-RADS Category 1: Negative    Recommendation:  Routine screening mammogram in 1 year is recommended.    Your estimated lifetime risk of breast cancer (to age 85) based on   Tyrer-Cuzick risk assessment model is 12.34 %.  According to the American "   Cancer Society, patients with a lifetime breast cancer risk of 20% or   higher might benefit from supplemental screening tests, such as screening   breast MRI.          Assessment/Plan     Suad Prater is a 38 y.o. female with:  1. Pain of left deltoid  Assessment & Plan:  Ultrasound left deltoid  Can use Tylenol ES 2 tabs po bid prn or apply aspercreme or Salon pas patches  Avoid sleeping on left side       2. Post-viral cough syndrome  Assessment & Plan:  Suck on Vit C lozenges, hydrate well, drink tea with honey.   Pt reassured of normal course of viral illnesses  Get immunizations to prevent disease  RTC if worsens                I spent a total of 27 minutes on the day of the visit.This includes face to face time and non-face to face time preparing to see the patient (eg, review of tests), obtaining and/or reviewing separately obtained history, documenting clinical information in the electronic or other health record, independently interpreting results and communicating results to the patient/family/caregiver, or care coordinator.   Code Status:     Sheila Hansen MD

## 2024-04-02 NOTE — ASSESSMENT & PLAN NOTE
Ultrasound left deltoid  Can use Tylenol ES 2 tabs po bid prn or apply aspercreme or Salon pas patches  Avoid sleeping on left side

## 2024-04-02 NOTE — ASSESSMENT & PLAN NOTE
Suck on Vit C lozenges, hydrate well, drink tea with honey.   Pt reassured of normal course of viral illnesses  Get immunizations to prevent disease  RTC if worsens

## 2024-04-02 NOTE — PATIENT INSTRUCTIONS
Ultrasound left deltoid  Can use Tylenol ES 2 tabs po bid prn or apply aspercreme or Salon pas patches  Avoid sleeping on left side     Suck on Vit C lozenges, hydrate

## 2024-04-23 ENCOUNTER — HOSPITAL ENCOUNTER (OUTPATIENT)
Dept: RADIOLOGY | Facility: HOSPITAL | Age: 39
Discharge: HOME | End: 2024-04-23
Attending: INTERNAL MEDICINE
Payer: COMMERCIAL

## 2024-04-23 DIAGNOSIS — M79.18 PAIN OF LEFT DELTOID: ICD-10-CM

## 2024-04-23 PROCEDURE — 76882 US LMTD JT/FCL EVL NVASC XTR: CPT | Mod: 26,LT,, | Performed by: RADIOLOGY

## 2024-04-23 PROCEDURE — 76882 US LMTD JT/FCL EVL NVASC XTR: CPT | Mod: TC,LT

## 2024-07-30 ENCOUNTER — PATIENT MESSAGE (OUTPATIENT)
Dept: PRIMARY CARE CLINIC | Facility: CLINIC | Age: 39
End: 2024-07-30
Payer: COMMERCIAL

## 2024-07-31 NOTE — TELEPHONE ENCOUNTER
Pt c/o diarrhea and abd pain and bloating for past 5 days after returning home from [place]. Please assist in scheduling. Attempted to schedule with no availability.

## 2024-08-01 ENCOUNTER — LAB VISIT (OUTPATIENT)
Dept: LAB | Facility: HOSPITAL | Age: 39
End: 2024-08-01
Attending: INTERNAL MEDICINE
Payer: COMMERCIAL

## 2024-08-01 ENCOUNTER — OFFICE VISIT (OUTPATIENT)
Dept: PRIMARY CARE CLINIC | Facility: CLINIC | Age: 39
End: 2024-08-01
Payer: COMMERCIAL

## 2024-08-01 VITALS
RESPIRATION RATE: 18 BRPM | DIASTOLIC BLOOD PRESSURE: 76 MMHG | HEART RATE: 76 BPM | TEMPERATURE: 99 F | HEIGHT: 62 IN | WEIGHT: 132.94 LBS | BODY MASS INDEX: 24.46 KG/M2 | OXYGEN SATURATION: 99 % | SYSTOLIC BLOOD PRESSURE: 110 MMHG

## 2024-08-01 DIAGNOSIS — R19.7 DIARRHEA, UNSPECIFIED TYPE: Primary | ICD-10-CM

## 2024-08-01 DIAGNOSIS — R19.7 DIARRHEA, UNSPECIFIED TYPE: ICD-10-CM

## 2024-08-01 PROCEDURE — 87329 GIARDIA AG IA: CPT | Performed by: INTERNAL MEDICINE

## 2024-08-01 PROCEDURE — 99214 OFFICE O/P EST MOD 30 MIN: CPT | Mod: S$GLB,,, | Performed by: INTERNAL MEDICINE

## 2024-08-01 PROCEDURE — 87015 SPECIMEN INFECT AGNT CONCNTJ: CPT | Performed by: INTERNAL MEDICINE

## 2024-08-01 PROCEDURE — 87045 FECES CULTURE AEROBIC BACT: CPT | Performed by: INTERNAL MEDICINE

## 2024-08-01 PROCEDURE — 1159F MED LIST DOCD IN RCRD: CPT | Mod: CPTII,S$GLB,, | Performed by: INTERNAL MEDICINE

## 2024-08-01 PROCEDURE — 87427 SHIGA-LIKE TOXIN AG IA: CPT | Performed by: INTERNAL MEDICINE

## 2024-08-01 PROCEDURE — 99999 PR PBB SHADOW E&M-EST. PATIENT-LVL III: CPT | Mod: PBBFAC,,, | Performed by: INTERNAL MEDICINE

## 2024-08-01 PROCEDURE — 3078F DIAST BP <80 MM HG: CPT | Mod: CPTII,S$GLB,, | Performed by: INTERNAL MEDICINE

## 2024-08-01 PROCEDURE — 3008F BODY MASS INDEX DOCD: CPT | Mod: CPTII,S$GLB,, | Performed by: INTERNAL MEDICINE

## 2024-08-01 PROCEDURE — 87046 STOOL CULTR AEROBIC BACT EA: CPT | Performed by: INTERNAL MEDICINE

## 2024-08-01 PROCEDURE — 87449 NOS EACH ORGANISM AG IA: CPT | Performed by: INTERNAL MEDICINE

## 2024-08-01 PROCEDURE — 87207 SMEAR SPECIAL STAIN: CPT | Performed by: INTERNAL MEDICINE

## 2024-08-01 PROCEDURE — 3074F SYST BP LT 130 MM HG: CPT | Mod: CPTII,S$GLB,, | Performed by: INTERNAL MEDICINE

## 2024-08-01 PROCEDURE — 87328 CRYPTOSPORIDIUM AG IA: CPT | Performed by: INTERNAL MEDICINE

## 2024-08-01 RX ORDER — MINOCYCLINE 40 MG/G
AEROSOL, FOAM TOPICAL
COMMUNITY
Start: 2024-05-24

## 2024-08-01 NOTE — PROGRESS NOTES
[facility] Progress Note  8/1/2024          Reason for Visit:      had concerns including Diarrhea.       History of Present Illness:     Patient recently traveled to [place] and was staying at a resort.  Since returning she has had watery diarrhea which is nonbloody but has had mucus at times.  She has not had fevers.  She has had crampy abdominal pain but no vomiting.  It has on going about 5 days now.  She has tried Pepto-Bismol which did not make much difference.  A couple of the people she was traveling with have also had some similar diarrhea      Problem List:     Patient Active Problem List   Diagnosis    Endometriosis    IgA deficiency    Gastroesophageal reflux disease    Pain of left deltoid    Post-viral cough syndrome         Medications:       Current Outpatient Medications:     AMZEEQ 4 % Foam, SMARTSIG:Sparingly Topical Every Night, Disp: , Rfl:     ARAZLO 0.045 % Lotn, SMARTSIG:Sparingly Topical Every Night, Disp: , Rfl:     omega-3 fatty acids/fish oil (FISH OIL-OMEGA-3 FATTY ACIDS) 300-1,000 mg capsule, Take 1 capsule by mouth once daily., Disp: , Rfl:     spironolactone (ALDACTONE) 100 MG tablet, Take 100 mg by mouth., Disp: , Rfl:     cefdinir (OMNICEF) 300 MG capsule, Take 300 mg by mouth every 12 (twelve) hours. (Patient not taking: Reported on 8/1/2024), Disp: , Rfl:     MULTIVITAMIN ORAL, Take by mouth. (Patient not taking: Reported on 8/1/2024), Disp: , Rfl:     TUMERIC-GING-OLIVE-OREG-CAPRYL ORAL, Take by mouth. (Patient not taking: Reported on 8/1/2024), Disp: , Rfl:         Review of Systems:     Review of Systems   Constitutional:  Negative for chills and fever.   HENT:  Negative for ear pain and sore throat.    Respiratory:  Negative for cough, shortness of breath and wheezing.    Cardiovascular:  Negative for chest pain and palpitations.   Gastrointestinal:  Positive for diarrhea. Negative for constipation, nausea and vomiting.   Genitourinary:  Negative for  "dysuria and hematuria.   Musculoskeletal:  Negative for joint swelling and joint deformity.   Neurological:  Negative for dizziness and weakness.           Physical Exam:     Temp:    99 °F (37.2 °C) (Oral)        Blood Pressure:  110/76        Pulse:   76     Respirations:  18  Weight:   60.3 kg (132 lb 15 oz)  Height:   5' 2" (1.575 m)  BMI:     Body mass index is 24.31 kg/m².    Physical Exam  Constitutional:       General: She is not in acute distress.  HENT:      Right Ear: Tympanic membrane normal.      Left Ear: Tympanic membrane normal.      Nose: No congestion or rhinorrhea.      Mouth/Throat:      Pharynx: No oropharyngeal exudate or posterior oropharyngeal erythema.   Cardiovascular:      Rate and Rhythm: Normal rate and regular rhythm.   Pulmonary:      Effort: Pulmonary effort is normal. No respiratory distress.      Breath sounds: No wheezing.   Abdominal:      General: There is no distension.      Palpations: Abdomen is soft. There is no mass.      Tenderness: There is no abdominal tenderness.   Skin:     General: Skin is warm.   Neurological:      General: No focal deficit present.      Mental Status: She is alert and oriented to person, place, and time.         Assessment and Plan:     1. Diarrhea, unspecified type  - Clostridium difficile EIA; Future  - Cyclospora; Future  - Giardia / Cryptosporidum, EIA; Future  - Stool culture; Future     We will get stool studies to rule out infection.  If this travels diarrhea I suspect that it will probably be self-limited.  If the stool studies are negative and the symptoms persist longer than a week and she has still not had any fever then could use Imodium as needed         Preston Bains MD  8/1/2024    This note was prepared using voice-recognition software.  Although efforts are made to proofread the note, some errors may persist in the final document.    "

## 2024-08-02 LAB
CRYPTOSP AG STL QL IA: NEGATIVE
G LAMBLIA AG STL QL IA: NEGATIVE

## 2024-08-03 LAB
E COLI SXT1 STL QL IA: NEGATIVE
E COLI SXT2 STL QL IA: NEGATIVE

## 2024-08-04 ENCOUNTER — PATIENT MESSAGE (OUTPATIENT)
Dept: PRIMARY CARE CLINIC | Facility: CLINIC | Age: 39
End: 2024-08-04
Payer: COMMERCIAL

## 2024-08-04 DIAGNOSIS — R19.7 DIARRHEA, UNSPECIFIED TYPE: Primary | ICD-10-CM

## 2024-08-04 LAB — BACTERIA STL CULT: NORMAL

## 2024-08-05 LAB — CYCLOSPORA STL SAFRANIN STN: NORMAL

## 2024-08-05 RX ORDER — DIPHENOXYLATE HYDROCHLORIDE AND ATROPINE SULFATE 2.5; .025 MG/1; MG/1
1 TABLET ORAL 4 TIMES DAILY PRN
Qty: 20 TABLET | Refills: 1 | Status: SHIPPED | OUTPATIENT
Start: 2024-08-05 | End: 2024-08-15

## 2024-09-25 ENCOUNTER — OFFICE VISIT (OUTPATIENT)
Dept: OBSTETRICS AND GYNECOLOGY | Facility: CLINIC | Age: 39
End: 2024-09-25
Payer: COMMERCIAL

## 2024-09-25 VITALS
DIASTOLIC BLOOD PRESSURE: 70 MMHG | HEIGHT: 62 IN | BODY MASS INDEX: 25.11 KG/M2 | SYSTOLIC BLOOD PRESSURE: 120 MMHG | WEIGHT: 136.44 LBS

## 2024-09-25 DIAGNOSIS — Z12.4 ENCOUNTER FOR SCREENING FOR CERVICAL CANCER: ICD-10-CM

## 2024-09-25 DIAGNOSIS — Z01.419 WELL WOMAN EXAM WITH ROUTINE GYNECOLOGICAL EXAM: Primary | ICD-10-CM

## 2024-09-25 DIAGNOSIS — N80.9 ENDOMETRIOSIS: ICD-10-CM

## 2024-09-25 DIAGNOSIS — Z12.31 SCREENING MAMMOGRAM FOR BREAST CANCER: ICD-10-CM

## 2024-09-25 PROCEDURE — 99396 PREV VISIT EST AGE 40-64: CPT | Mod: S$GLB,,, | Performed by: OBSTETRICS & GYNECOLOGY

## 2024-09-25 PROCEDURE — 3008F BODY MASS INDEX DOCD: CPT | Mod: CPTII,S$GLB,, | Performed by: OBSTETRICS & GYNECOLOGY

## 2024-09-25 PROCEDURE — 3078F DIAST BP <80 MM HG: CPT | Mod: CPTII,S$GLB,, | Performed by: OBSTETRICS & GYNECOLOGY

## 2024-09-25 PROCEDURE — 3074F SYST BP LT 130 MM HG: CPT | Mod: CPTII,S$GLB,, | Performed by: OBSTETRICS & GYNECOLOGY

## 2024-09-25 PROCEDURE — 1159F MED LIST DOCD IN RCRD: CPT | Mod: CPTII,S$GLB,, | Performed by: OBSTETRICS & GYNECOLOGY

## 2024-09-25 PROCEDURE — 88175 CYTOPATH C/V AUTO FLUID REDO: CPT | Performed by: OBSTETRICS & GYNECOLOGY

## 2024-09-25 PROCEDURE — 99999 PR PBB SHADOW E&M-EST. PATIENT-LVL III: CPT | Mod: PBBFAC,,, | Performed by: OBSTETRICS & GYNECOLOGY

## 2024-09-25 RX ORDER — CHOLECALCIFEROL (VITAMIN D3) 25 MCG
1000 TABLET ORAL DAILY
COMMUNITY

## 2024-09-25 RX ORDER — NAPROXEN SODIUM 550 MG/1
550 TABLET ORAL 2 TIMES DAILY WITH MEALS
Qty: 60 TABLET | Refills: 2 | Status: SHIPPED | OUTPATIENT
Start: 2024-09-25

## 2024-09-25 RX ORDER — VITAMIN B COMPLEX
1 CAPSULE ORAL DAILY
COMMUNITY

## 2024-09-25 RX ORDER — RELUGOLIX, ESTRADIOL HEMIHYDRATE, AND NORETHINDRONE ACETATE 40; 1; .5 MG/1; MG/1; MG/1
1 TABLET, FILM COATED ORAL DAILY
Qty: 90 TABLET | Refills: 1 | Status: SHIPPED | OUTPATIENT
Start: 2024-09-25 | End: 2025-03-24

## 2024-09-25 RX ORDER — PROGESTERONE 100 MG/1
100 CAPSULE ORAL NIGHTLY
COMMUNITY
Start: 2024-09-24

## 2024-09-25 RX ORDER — MULTIVITAMIN
1 TABLET ORAL DAILY
COMMUNITY

## 2024-09-25 NOTE — PROGRESS NOTES
HISTORY OF PRESENT ILLNESS:    Suad Prater is a 38 y.o. female, , Patient's last menstrual period was 2024 (exact date).,  presents for a routine annual exam .   Last pap:  1 year  Contraception: vasectomy.    Sexually transmitted infection risk: very low risk of STD exposure.   Menstrual flow: regular every 28-30 days.  This is the extent of the patient's complaints at this time.     Past Medical History:   Diagnosis Date    Endometriosis, unspecified     stage 4 diagnosed about 10 yrs ago       Past Surgical History:   Procedure Laterality Date     SECTION  2017    LAPAROTOMY  2014    endometriosis    laproscopy      endometriosis        MEDICATIONS AND ALLERGIES:      Current Outpatient Medications:     AMZEEQ 4 % Foam, SMARTSIG:Sparingly Topical Every Night, Disp: , Rfl:     ARAZLO 0.045 % Lotn, SMARTSIG:Sparingly Topical Every Night, Disp: , Rfl:     b complex vitamins capsule, Take 1 capsule by mouth once daily., Disp: , Rfl:     Lactobacillus rhamnosus GG (CULTURELLE) 10 billion cell capsule, Take 1 capsule by mouth once daily. Probiotic, Disp: , Rfl:     multivitamin (THERAGRAN) per tablet, Take 1 tablet by mouth once daily., Disp: , Rfl:     omega-3 fatty acids/fish oil (FISH OIL-OMEGA-3 FATTY ACIDS) 300-1,000 mg capsule, Take 1 capsule by mouth once daily., Disp: , Rfl:     spironolactone (ALDACTONE) 100 MG tablet, Take 100 mg by mouth., Disp: , Rfl:     vitamin D (VITAMIN D3) 1000 units Tab, Take 1,000 Units by mouth once daily., Disp: , Rfl:     progesterone (PROMETRIUM) 100 MG capsule, Take 100 mg by mouth every evening. (Patient not taking: Reported on 2024), Disp: , Rfl:     Review of patient's allergies indicates:   Allergen Reactions    Sulfa (sulfonamide antibiotics) Rash and Swelling       Family History   Problem Relation Name Age of Onset    Diabetes Paternal Grandmother      Alzheimer's disease Maternal Grandfather      Parkinsonism Maternal  Grandfather      Brain cancer Father      Hypertension Mother      Breast cancer Neg Hx      Colon cancer Neg Hx      Ovarian cancer Neg Hx      Cervical cancer Neg Hx      Uterine cancer Neg Hx         Social History     Socioeconomic History    Marital status:    Tobacco Use    Smoking status: Never    Smokeless tobacco: Never   Substance and Sexual Activity    Alcohol use: Yes     Comment: 2-3 drinks per week    Drug use: Never    Sexual activity: Yes     Partners: Male     Birth control/protection: None     Comment:  to   Rishi     Social Determinants of Health     Financial Resource Strain: Low Risk  (3/30/2024)    Overall Financial Resource Strain (CARDIA)     Difficulty of Paying Living Expenses: Not hard at all   Food Insecurity: No Food Insecurity (3/30/2024)    Hunger Vital Sign     Worried About Running Out of Food in the Last Year: Never true     Ran Out of Food in the Last Year: Never true   Transportation Needs: No Transportation Needs (3/30/2024)    PRAPARE - Transportation     Lack of Transportation (Medical): No     Lack of Transportation (Non-Medical): No   Physical Activity: Sufficiently Active (3/30/2024)    Exercise Vital Sign     Days of Exercise per Week: 3 days     Minutes of Exercise per Session: 50 min   Stress: No Stress Concern Present (3/30/2024)    [employer] [place] [employer] - Occupational Stress Questionnaire     Feeling of Stress : Only a little   Housing Stability: Low Risk  (3/30/2024)    Housing Stability Vital Sign     Unable to Pay for Housing in the Last Year: No     Number of Places Lived in the Last Year: 1     Unstable Housing in the Last Year: No       OB History    Para Term  AB Living   1 1 1         SAB IAB Ectopic Multiple Live Births           1      # Outcome Date GA Lbr [place]/2nd Weight Sex Type Anes PTL Lv   1 Term                   COMPREHENSIVE GYN HISTORY:  PAP History: Denies abnormal Paps.  Infection History:  "Denies STDs. Denies PID.  Benign History: Denies uterine fibroids. Denies ovarian cysts. Denies endometriosis. Denies other conditions.  Cancer History: Denies cervical cancer. Denies uterine cancer or hyperplasia. Denies ovarian cancer. Denies vulvar cancer or pre-cancer. Denies vaginal cancer or pre-cancer. Denies breast cancer. Denies colon cancer.      ROS:  GENERAL: No weight changes. No swelling. No fatigue. No fever.  BREASTS: No pain. No lumps. No discharge.  ABDOMEN: No pain. No nausea. No vomiting. No diarrhea. No constipation.  REPRODUCTIVE: No abnormal bleeding. No pelvic pain.   VULVA: No pain. No lesions. No itching.  VAGINA: No relaxation. No itching. No odor. No discharge. No lesions.  URINARY: No incontinence. No nocturia. No frequency. No dysuria.    /70   Ht 5' 2.01" (1.575 m)   Wt 61.9 kg (136 lb 7.4 oz)   LMP 09/22/2024 (Exact Date)   BMI 24.95 kg/m²     PE:  Physical Exam   Vitals signs and nursing note reviewed.   Constitutional:       Appearance: Normal appearance.   HENT:      Head: Normocephalic and atraumatic.      Nose: Nose normal.      Mouth/Throat:      Mouth: Mucous membranes are moist.   Eyes:      Conjunctiva/sclera: Conjunctivae normal.   Neck:      Musculoskeletal: Normal range of motion.   Pulmonary:      Effort: Pulmonary effort is normal.   Breast: no masses palpated  bilateral    Abdominal:      Palpations: Abdomen is soft.   Genitourinary:     General: Normal vulva.      Vagina: No signs of injury and foreign body. No vaginal discharge, erythema, tenderness, bleeding, lesions or prolapsed vaginal walls.      Cervix: Normal. Pap taken     Uterus: Normal.       Adnexa: Right adnexa normal and left adnexa normal.      Musculoskeletal: Normal range of motion.   Skin:     General: Skin is warm and dry.   Neurological:      General: No focal deficit present.      Mental Status: She is alert.   Psychiatric:         Mood and Affect: Mood normal.         Behavior: Behavior " normal.         Thought Content: Thought content normal.         Judgment: Judgment normal.    PROCEDURES/ORDERS:  Pap      Assessment/Plan:      Well woman exam with routine gynecological exam    Encounter for screening for cervical cancer  -     Liquid-Based Pap Smear, Screening    Screening mammogram for breast cancer    Endometriosis        Orders Placed This Encounter    Liquid-Based Pap Smear, Screening        COUNSELING:  The patient was counseled today on:  -A.C.S. Pap and pelvic exam guidelines, recomendations for yearly mammogram, monthly self breast exams and to follow up with her PCP for other health maintenance.    FOLLOW-UP  annually for [employer].     Answers submitted by the patient for this visit:  Pelvic Pain Questionnaire (Submitted on 2024)  Chief Complaint: Pelvic pain  Chronicity: recurrent  Onset: more than 1 year ago  Frequency: intermittently  Progression since onset: unchanged  Pain severity: moderate  Affected side: both  Pregnant now?: No  abdominal pain: Yes  anorexia: No  back pain: Yes  chills: No  constipation: No  diarrhea: No  discolored urine: No  dysuria: No  fever: No  flank pain: Yes  frequency: No  headaches: No  hematuria: No  nausea: No  painful intercourse: Yes  rash: No  urgency: No  vomiting: No  Aggravated by: menstrual cycle  treatments tried: acetaminophen, heating pad, NSAIDs, warm bath  Improvement on treatment: moderate  Sexual activity: sexually active  Partner with STD symptoms: no  Birth control: nothing  Menstrual history: irregular  STD: No  abdominal surgery: No   section: No  Endometriosis: Yes  herpes simplex: No  gynecological surgery: Yes  menorrhagia: Yes  metrorrhagia: Yes  miscarriage: No  ovarian cysts: Yes  perineal abscess: No  PID: No  terminated pregnancy: No  vaginosis: No

## 2024-09-26 LAB
CLINICAL INFO: NORMAL
DATE OF PREVIOUS PAP: NORMAL
DATE PREVIOUS BX: NO
LMP START DATE: NORMAL
SPECIMEN SOURCE CVX/VAG CYTO: NORMAL

## 2024-09-27 ENCOUNTER — TELEPHONE (OUTPATIENT)
Dept: OBSTETRICS AND GYNECOLOGY | Facility: CLINIC | Age: 39
End: 2024-09-27
Payer: COMMERCIAL

## 2024-09-27 NOTE — TELEPHONE ENCOUNTER
----- Message from Carmen Freitas sent at 9/27/2024 12:05 PM CDT -----  Type:  Pharmacy Calling to Clarify an RX    Name of Caller:Karli   Pharmacy Name:[facility]  Prescription Name:relugolix-estradiol-norethindr (MYFEMBREE) 40-1-0.5 mg Tab  What do they need to clarify? Authorization  Best Call Back Number:[phone]  ex [phone]  Additional Information:  call back

## 2024-09-30 ENCOUNTER — TELEPHONE (OUTPATIENT)
Dept: OBSTETRICS AND GYNECOLOGY | Facility: CLINIC | Age: 39
End: 2024-09-30
Payer: COMMERCIAL

## 2024-09-30 ENCOUNTER — PATIENT MESSAGE (OUTPATIENT)
Dept: OBSTETRICS AND GYNECOLOGY | Facility: CLINIC | Age: 39
End: 2024-09-30
Payer: COMMERCIAL

## 2024-09-30 DIAGNOSIS — N80.9 ENDOMETRIOSIS: ICD-10-CM

## 2024-09-30 RX ORDER — RELUGOLIX, ESTRADIOL HEMIHYDRATE, AND NORETHINDRONE ACETATE 40; 1; .5 MG/1; MG/1; MG/1
1 TABLET, FILM COATED ORAL DAILY
Qty: 90 TABLET | Refills: 1 | Status: SHIPPED | OUTPATIENT
Start: 2024-09-30 | End: 2025-03-29

## 2024-09-30 NOTE — TELEPHONE ENCOUNTER
I called pt back in regards to her prior auth that our office contacted her in regards to. Pt did not answer, but I left pt a VM asking her to call back.

## 2024-09-30 NOTE — TELEPHONE ENCOUNTER
----- Message from Laura sent at 9/27/2024  4:25 PM CDT -----  Regarding: returning call    Type:  Patient Returning Call    Who Called:pt    Who Left Message for Patient:Ayaka     Does the patient know what this is regarding?prior auth    Best Call Back Number: [phone]    Additional Information:

## 2024-10-03 ENCOUNTER — TELEPHONE (OUTPATIENT)
Dept: OBSTETRICS AND GYNECOLOGY | Facility: CLINIC | Age: 39
End: 2024-10-03
Payer: COMMERCIAL

## 2024-10-03 NOTE — TELEPHONE ENCOUNTER
I contacted pt to let her know the Myfembree was approved for her and the co pay is 5$. I advised her that [facility] should call her as well, but I told her I would let her know. I provided pt with address to pharmacy. Pt verbalized understanding and agrees with plan.

## 2024-10-30 ENCOUNTER — PATIENT MESSAGE (OUTPATIENT)
Dept: OBSTETRICS AND GYNECOLOGY | Facility: CLINIC | Age: 39
End: 2024-10-30
Payer: COMMERCIAL

## 2025-01-03 ENCOUNTER — OFFICE VISIT (OUTPATIENT)
Dept: OPTOMETRY | Facility: CLINIC | Age: 40
End: 2025-01-03
Payer: COMMERCIAL

## 2025-01-03 DIAGNOSIS — H16.223 KERATOCONJUNCTIVITIS SICCA, NOT SPECIFIED AS SJOGREN'S, BILATERAL: Primary | ICD-10-CM

## 2025-01-03 PROCEDURE — 99999 PR PBB SHADOW E&M-EST. PATIENT-LVL III: CPT | Mod: PBBFAC,,, | Performed by: OPTOMETRIST

## 2025-01-03 NOTE — PROGRESS NOTES
HPI    Patient states that she woke up with a red circular spot OS. Pt denies   pain, but states that she has had lots of mucus leaking from her eyes,   especially when waking up. Also noticing more problems with glare at night   and seeing star burst around head lights.     Gtts:  Systane AT's PRN OU   Last edited by Dilma Alicia, OD on 1/3/2025 10:26 AM.            Assessment /Plan     For exam results, see Encounter Report.    Keratoconjunctivitis sicca, not specified as Sjogren's, bilateral  -     lifitegrast (XIIDRA) 5 % Dpet; Place 1 drop into both eyes 2 (two) times daily.  Dispense: 60 each; Refill: 11      Educated pt on findings. Significant dry eye OU. Rx Xiidra 1 gtt OU BID. Educated on Xiidra's delayed effectiveness. Recommend additional ATs prn. If symptoms worsen or dont improve, RTC. Monitor 1-2 months.       RTC in 1-2 months for dry eye f/u or sooner if needed.

## 2025-01-08 ENCOUNTER — HOSPITAL ENCOUNTER (OUTPATIENT)
Dept: RADIOLOGY | Facility: HOSPITAL | Age: 40
Discharge: HOME | End: 2025-01-08
Attending: OBSTETRICS & GYNECOLOGY
Payer: COMMERCIAL

## 2025-01-08 DIAGNOSIS — Z12.31 SCREENING MAMMOGRAM FOR BREAST CANCER: ICD-10-CM

## 2025-01-08 PROCEDURE — 77067 SCR MAMMO BI INCL CAD: CPT | Mod: 26,,, | Performed by: RADIOLOGY

## 2025-01-08 PROCEDURE — 77063 BREAST TOMOSYNTHESIS BI: CPT | Mod: 26,,, | Performed by: RADIOLOGY

## 2025-01-08 PROCEDURE — 77063 BREAST TOMOSYNTHESIS BI: CPT | Mod: TC

## 2025-01-25 ENCOUNTER — PATIENT MESSAGE (OUTPATIENT)
Dept: PRIMARY CARE CLINIC | Facility: CLINIC | Age: 40
End: 2025-01-25
Payer: COMMERCIAL

## 2025-01-25 NOTE — TELEPHONE ENCOUNTER
Called to triage pt from portal message. Pt is currently at . Pt states she started flu sx Monday, daughter tested positive for flu Monday. Pt c/o wheezing and labored breathing last night, used a nebulizer with improvement. Pt also c/o chest feeling tight. Pt currently under the care of the  but requesting a sooner appt with PCP, currently scheduled for 2/4. Will send to staff to assist in sooner follow up appt.

## 2025-01-27 NOTE — PROGRESS NOTES
[facility] Progress Note  1/28/2025          Reason for Visit:      had concerns including Follow-up ( Monday; tested negative for flu & covid, daughter was positive. Thursday; started wheezing).       History of Present Illness:     Patient presents today for follow-up after recent flu-like illness with new onset wheezing and shortness of breath.    HISTORY OF PRESENT ILLNESS:  She experienced a recent flu-like illness with fever lasting approximately 4 days. Despite testing negative for flu, she was prescribed Tamiflu due to potential exposure from her daughter who tested positive. She developed new onset wheezing Thursday night with associated shortness of breath. While the wheezing has improved with nebulizer treatment, she continues to experience shortness of breath. She denies any history of asthma or previous episodes of wheezing. She reports ongoing sinus congestion with associated fatigue and headache described as an 'achy' sensation with dental sensitivity attributed to sinus pressure. She also notes poor appetite.    She visited urgent care where she received antibiotics, nebulizer refills with anti-inflammatory medication and albuterol, cough syrup, and prednisone. She had been using a nebulizer at home prior to the urgent care visit with temporary symptom improvement.    GYNECOLOGIC HISTORY:  She has a history of endometriosis, currently in month 4 of 6-month treatment with Fembry. She reports weight changes and hormonal effects but denies inflammation pain. Previous gynecologist determined surgery was not warranted, but she is now seeking new gynecologist to continue care as previous provider has left practice.       Problem List:     Patient Active Problem List   Diagnosis    Endometriosis    IgA deficiency    Gastroesophageal reflux disease    Pain of left deltoid    Post-viral cough syndrome         Medications:       Current Outpatient Medications:     AMZEEQ 4 % Foam, as  "needed., Disp: , Rfl:     ARAZLO 0.045 % Lotn, as needed., Disp: , Rfl:     lifitegrast (XIIDRA) 5 % Dpet, Place 1 drop into both eyes 2 (two) times daily., Disp: 60 each, Rfl: 11    multivitamin (THERAGRAN) per tablet, Take 1 tablet by mouth once daily., Disp: , Rfl:     naproxen sodium (ANAPROX) 550 MG tablet, Take 1 tablet (550 mg total) by mouth 2 (two) times daily with meals. (Patient taking differently: Take 550 mg by mouth as needed.), Disp: 60 tablet, Rfl: 2    omega-3 fatty acids/fish oil (FISH OIL-OMEGA-3 FATTY ACIDS) 300-1,000 mg capsule, Take 1 capsule by mouth once daily., Disp: , Rfl:     relugolix-estradiol-norethindr (MYFEMBREE) 40-1-0.5 mg Tab, Take 1 tablet by mouth once daily., Disp: 90 tablet, Rfl: 1    spironolactone (ALDACTONE) 100 MG tablet, Take 100 mg by mouth once daily., Disp: , Rfl:     vitamin D (VITAMIN D3) 1000 units Tab, Take 1,000 Units by mouth once daily., Disp: , Rfl:         Review of Systems:     Review of Systems   Constitutional:  Negative for chills and fever.   HENT:  Negative for ear pain and sore throat.    Respiratory:  Positive for cough and wheezing. Negative for shortness of breath.    Cardiovascular:  Negative for chest pain and palpitations.   Gastrointestinal:  Negative for constipation, diarrhea, nausea and vomiting.   Genitourinary:  Negative for dysuria and hematuria.   Musculoskeletal:  Negative for joint swelling and joint deformity.   Neurological:  Negative for dizziness and weakness.           Physical Exam:     Temp:             Blood Pressure:  118/80        Pulse:   89     Respirations:     Weight:   61.9 kg (136 lb 7.4 oz)  Height:   5' 2.5" (1.588 m)  BMI:     Body mass index is 24.56 kg/m².    Physical Exam  Constitutional:       General: She is not in acute distress.  HENT:      Right Ear: Tympanic membrane normal.      Left Ear: Tympanic membrane normal.      Nose: No congestion or rhinorrhea.      Mouth/Throat:      Pharynx: No oropharyngeal exudate " or posterior oropharyngeal erythema.   Cardiovascular:      Rate and Rhythm: Normal rate and regular rhythm.   Pulmonary:      Effort: Pulmonary effort is normal. No respiratory distress.      Breath sounds: No wheezing.      Comments: End inspiratory squeak on right base  Abdominal:      Palpations: Abdomen is soft.      Tenderness: There is no abdominal tenderness.   Skin:     General: Skin is warm.   Neurological:      General: No focal deficit present.      Mental Status: She is alert and oriented to person, place, and time.           Assessment and Plan:     1. Acute bronchitis, unspecified organism  - Complete the steroid pack (prednisone) and antibiotics (Augmentin). 2.  - Use nebulizer with albuterol and an anti-inflammatory medication as needed or preventatively before strenuous activities.  - Considered acute bronchitis as the likely cause of wheezing and shortness of breath, rather than underlying asthma.  - Explained that these symptoms are likely due to a viral illness.  - Noted this is the patient's first experience with wheezing during this illness.  - Discussed expected timeline for symptom improvement, noting persistence beyond 2 weeks may warrant further evaluation.  - Advised on proper use of nebulizer for symptom management: use albuterol as needed for shortness of breath or preventatively before strenuous activities, not necessarily every 6-8 hours.  - Recommend using common sense approach to exercise, avoiding activities that cause significant shortness of breath.    2. IgA deficiency  Noted IgA level of less than 5 on labs from 7/2023    ENDOMETRIOSIS:  Encouraged to follow up with Gyn      FOLLOW UP:  - Recommend follow up in 2 weeks if shortness of breath or wheezing persists.  - Contact the office if new symptoms develop or current symptoms worsen.           No follow-ups on file.       Preston Bains MD  1/28/2025    This note was prepared using voice-recognition software.  Although  efforts are made to proofread the note, some errors may persist in the final document.

## 2025-01-28 ENCOUNTER — OFFICE VISIT (OUTPATIENT)
Dept: PRIMARY CARE CLINIC | Facility: CLINIC | Age: 40
End: 2025-01-28
Payer: COMMERCIAL

## 2025-01-28 VITALS
HEIGHT: 63 IN | WEIGHT: 136.44 LBS | BODY MASS INDEX: 24.18 KG/M2 | OXYGEN SATURATION: 98 % | DIASTOLIC BLOOD PRESSURE: 80 MMHG | SYSTOLIC BLOOD PRESSURE: 118 MMHG | HEART RATE: 89 BPM

## 2025-01-28 DIAGNOSIS — D80.2 IGA DEFICIENCY: ICD-10-CM

## 2025-01-28 DIAGNOSIS — J20.9 ACUTE BRONCHITIS, UNSPECIFIED ORGANISM: Primary | ICD-10-CM

## 2025-01-28 PROCEDURE — 1159F MED LIST DOCD IN RCRD: CPT | Mod: CPTII,S$GLB,, | Performed by: INTERNAL MEDICINE

## 2025-01-28 PROCEDURE — 3079F DIAST BP 80-89 MM HG: CPT | Mod: CPTII,S$GLB,, | Performed by: INTERNAL MEDICINE

## 2025-01-28 PROCEDURE — 99999 PR PBB SHADOW E&M-EST. PATIENT-LVL III: CPT | Mod: PBBFAC,,, | Performed by: INTERNAL MEDICINE

## 2025-01-28 PROCEDURE — 3008F BODY MASS INDEX DOCD: CPT | Mod: CPTII,S$GLB,, | Performed by: INTERNAL MEDICINE

## 2025-01-28 PROCEDURE — 99214 OFFICE O/P EST MOD 30 MIN: CPT | Mod: S$GLB,,, | Performed by: INTERNAL MEDICINE

## 2025-01-28 PROCEDURE — 3074F SYST BP LT 130 MM HG: CPT | Mod: CPTII,S$GLB,, | Performed by: INTERNAL MEDICINE

## 2025-02-05 ENCOUNTER — PATIENT MESSAGE (OUTPATIENT)
Dept: OBSTETRICS AND GYNECOLOGY | Facility: CLINIC | Age: 40
End: 2025-02-05
Payer: COMMERCIAL

## 2025-02-06 ENCOUNTER — PATIENT MESSAGE (OUTPATIENT)
Dept: PRIMARY CARE CLINIC | Facility: CLINIC | Age: 40
End: 2025-02-06
Payer: COMMERCIAL

## 2025-02-06 ENCOUNTER — CLINICAL SUPPORT (OUTPATIENT)
Dept: OBSTETRICS AND GYNECOLOGY | Facility: CLINIC | Age: 40
End: 2025-02-06
Payer: COMMERCIAL

## 2025-02-06 DIAGNOSIS — N95.1 MENOPAUSAL SYMPTOMS: Primary | ICD-10-CM

## 2025-02-06 RX ORDER — BENZONATATE 200 MG/1
200 CAPSULE ORAL 3 TIMES DAILY PRN
Qty: 21 CAPSULE | Refills: 0 | Status: SHIPPED | OUTPATIENT
Start: 2025-02-06 | End: 2025-02-16

## 2025-02-06 RX ORDER — TIZANIDINE 4 MG/1
4 TABLET ORAL EVERY 6 HOURS PRN
Qty: 30 TABLET | Refills: 1 | Status: SHIPPED | OUTPATIENT
Start: 2025-02-06 | End: 2025-02-16

## 2025-02-06 NOTE — PROGRESS NOTES
Personal Information    Full Name: Suad Prater 1985    PCP- Dr. Preston Baisn    Medical History    Do you have a chronic medical condition? (e.g., diabetes, hypertension, thyroid issues)   If yes, please specify:   No    2.   Do you have a history of hormone- related conditions? (e.g., endometriosis, breast cancer)   If yes, please explain:   Yes - Stage  4 Endometriosis- Laparotomy 2014    3.   Have you previously or are you currently taking hormone replacement therapy?   If yes, please list:   Yes - Myfembree Daily (6 months) Topical Progesterone 8/2020-11/2022    Menstrual History    At what age did you begin menstruating?   8    2.   Have you experienced any changes in your menstrual cycle in the last year?   If yes, please describe:   Yes - MyFembree amenorrhea 11/2024    3.   When was your last menstrual period or age of last period?   11/2024    4.   Have you had a hysterectomy?   If yes, at what age?  No    Symptoms Assesments      Are you experiencing any of the following symptoms:  Hot Flashes: No   Night Sweats: No   Vaginal Dryness or Pain with [place]: Yes   Mood Swings: No   Anxiety or Depression: No   Sleep Disturbances: No   Fatigue: Yes   Weight Gain: Yes   Joint or Muscle Pain: Yes   Memory Issues or Brain Fog: Yes   Decreased Interest in Sex (Low Libido): Yes  Colonoscopy: Yes 2020     Lifestyle Factors    How would you describe your diet?  Balanced    2.   How often do you exercise?   Regularly    3.   Do you smoke or consume alcohol?   If yes, please specify frequency:   Yes - Does not smoke. Drinks socially     4.   How would you rate your stress levels?   High    Family History    Is there a family history of menopause-related conditions? (e.g., osteoporosis, breast cancer)  If yes, please specify  Yes - Mother-osteoporosis    2.   Is there a family history of heart disease?   If yes, please specify   Yes - Maternal Family History heart disease             Spoke with  patient for a total of 30 minutes during virtual visit.  Patient was guided through expectations and treatment options.     Questions answered. Encouraged to send message or call office with any questions/concerns. Verbalized understanding.       Yovana

## 2025-02-26 ENCOUNTER — TELEPHONE (OUTPATIENT)
Dept: OBSTETRICS AND GYNECOLOGY | Facility: CLINIC | Age: 40
End: 2025-02-26
Payer: COMMERCIAL

## 2025-02-26 NOTE — TELEPHONE ENCOUNTER
S/w patient and advised patient that Hormone Consultation and Annual exam cannot be in the same appointment. I advised patient that she has already completed hormone assessment and annual exam wont be due until September of 2025. Patient stated she would like the visit to be for hormone consult. Appt note updated. No further action necessary.

## 2025-02-27 ENCOUNTER — OFFICE VISIT (OUTPATIENT)
Dept: OBSTETRICS AND GYNECOLOGY | Facility: CLINIC | Age: 40
End: 2025-02-27
Payer: COMMERCIAL

## 2025-02-27 VITALS
SYSTOLIC BLOOD PRESSURE: 104 MMHG | DIASTOLIC BLOOD PRESSURE: 70 MMHG | WEIGHT: 140.38 LBS | HEART RATE: 94 BPM | BODY MASS INDEX: 25.27 KG/M2

## 2025-02-27 DIAGNOSIS — N89.8 VAGINAL DRYNESS: Primary | ICD-10-CM

## 2025-02-27 PROCEDURE — 99999 PR PBB SHADOW E&M-EST. PATIENT-LVL III: CPT | Mod: PBBFAC,,, | Performed by: OBSTETRICS & GYNECOLOGY

## 2025-02-27 RX ORDER — ESTRADIOL 0.1 MG/G
1 CREAM VAGINAL DAILY
Qty: 42 G | Refills: 4 | Status: SHIPPED | OUTPATIENT
Start: 2025-02-27

## 2025-02-27 NOTE — PROGRESS NOTES
[facility] Hormone Consult Preparation Sheet    Patient Name: Suad Prater 39 y.o. female with a history of stage 4 endometriosis who presents for hormone consult.  Patient is currently on MyFembree which she started in October 2024.  She has been amenorrheic since 11/2024.  Patient states that her pain associated with ovulation has been suppressed on the current medication.  The plan was to continue MyFrenbree for 6 months and then stop.  Patient had questions about expectations and next steps after she stops the medication.  Patient states that she has had vaginal dryness, brain fog, fatigue which are potential side effects of the MyFembree.  She reports decreased libido and has noticed that if she misses a dose of the MyFembree her libido improves. She goes to bed a 10:30-11PM and wakes up at 5:30 AM.  She has no trouble falling or staying asleep.  Patient does use electronics prior to and in bed.  She states she does snore.  She states she has gain approximately 10lbs and increased breast size and a shift of weight in her lower abdomen over the last 6 months.  She does use the Peloton 30 minutes 3-4 times a week and weight training 2-3 times a week.  She does notice bilateral knee pain too.  BMI 25.  Her diet is well balanced with vegetables and increased protein intake.    Provider: Chinyere Grullon  CC and Symptoms:   Chief Complaint   Patient presents with    Hormone Consult       Patient History:  Problem List[1]    Patient Medications:  Current Outpatient Medications on File Prior to Visit   Medication Sig    AMZEEQ 4 % Foam as needed.    ARAZLO 0.045 % Lotn as needed.    lifitegrast (XIIDRA) 5 % Dpet Place 1 drop into both eyes 2 (two) times daily.    multivitamin (THERAGRAN) per tablet Take 1 tablet by mouth once daily.    omega-3 fatty acids/fish oil (FISH OIL-OMEGA-3 FATTY ACIDS) 300-1,000 mg capsule Take 1 capsule by mouth once daily.    relugolix-estradiol-norethindr  "(MYFEMBREE) 40-1-0.5 mg Tab Take 1 tablet by mouth once daily.    spironolactone (ALDACTONE) 100 MG tablet Take 100 mg by mouth once daily.    vitamin D (VITAMIN D3) 1000 units Tab Take 1,000 Units by mouth once daily.    naproxen sodium (ANAPROX) 550 MG tablet Take 1 tablet (550 mg total) by mouth 2 (two) times daily with meals. (Patient taking differently: Take 550 mg by mouth as needed.)     No current facility-administered medications on file prior to visit.       Patient Imaging and Procedures  LMP: Patient's last menstrual period was 11/17/2024 (exact date).  PAP: 10/2/2024  HPV: No results found for: "HPV"  Mammo: 12/2023 WNL  Colonoscopy:    BMI: Body mass index is 25.27 kg/m².    Recent Labs:  Estradiol: No results found for: "ESTRADIOL"  Testosterone: No results found for: "TESTOSTERONE"  FSH: No results found for: "FSH"  CBC: No results found for: "WBC", "HGB", "HCT", "MCV", "PLT"    CMP:No results found for: "NA", "K", "CL", "CO2", "GLU", "BUN", "CREATININE", "CALCIUM", "PROT", "ALBUMIN", "BILITOT", "ALKPHOS", "AST", "ALT", "ANIONGAP", "ESTGFRAFRICA", "EGFRNONAA"  Lipids: No results found for: "CHOL", "TRIG", "HDL", "LDLCALC", "CHOLHDL", "TOTALCHOLEST", "NONHDLCHOL"  A1C: No results found for: "LABA1C", "HGBA1C"    Assessment & Plan   Endometriosis  -stage 4 diagnosed in 2010  -Currently on month 5 of 6 of MyFembree  -Discussed many of her symptoms may be due to the medication including fatigue, decreased libido and vaginal dryness    Vaginal Dryness   ? Emphasized benefits of local vaginal estrogen for genitourinary syndrome of menopause (GSM)   ? Provided instructions for application (e.g., cream, ring, or tablet) and stressed consistent use for optimal results.   -Vaginal estrogen 1gram everyday for 2 weeks, then twice a week    Libido   ? Reviewed testosterone therapy as an option for hypoactive sexual desire disorder (HSDD): ? Delivery methods (gel, injection, pellets) and appropriate dosing for " women as well as risks and benefits of each method, there is also Addyi and Vyleesi as an option  ? Risks (e.g., acne, hair growth, hair loss, deepening of the voice) and off-label status (not FDA-approved for women).   ? May be a side effect of the MyFembree, would see if improves after stopping medication at end of March    Sleep   ? Provided education on sleep hygiene: consistent sleep/wake times, limiting caffeine/alcohol, and avoiding screens before bed.   ? Encouraged stimulus control (bed for sleep and sex only) and relaxation techniques   ? Highlighted the impact of poor sleep on physical and mental health, including mood, memory, and overall quality of life.     Exercise   ? Reinforced importance of 150 minutes of moderate aerobic activity per week and resistance training twice weekly to counteract bone loss and maintain lean muscle mass.   ? Discussed the accelerated loss of bone and muscle during jose- and postmenopause and its role in reducing fracture risk and preserving physical function.     Diet   ? Reviewed protein intake recommendation (at least 0.8g/kg/day) for muscle maintenance, with a focus on high-quality protein sources   ? Recommended 1200mg calcium and 800-1000IU vitamin D daily to support bone health.   ? Encouraged hydration (at least 2L/day) to improve fatigue, cognition, and overall metabolic function.   -Weight loss continue consistently doing exercise and following well-balanced diet, patient is following a good regimen and encourage her to not get discouraged.     Additional Recommendations   ? Discussed routine follow-up to assess symptom management and adjust hormone therapy or lifestyle interventions as needed.     Patient plans to stop MyFembree at the end of March.  Will reassess for the next steps depending on signs and symptoms       [1]   Patient Active Problem List  Diagnosis    Endometriosis    IgA deficiency    Gastroesophageal reflux disease    Pain of left deltoid     Post-viral cough syndrome

## 2025-05-13 ENCOUNTER — TELEPHONE (OUTPATIENT)
Dept: OBSTETRICS AND GYNECOLOGY | Facility: CLINIC | Age: 40
End: 2025-05-13
Payer: COMMERCIAL

## 2025-05-13 ENCOUNTER — LAB VISIT (OUTPATIENT)
Dept: LAB | Facility: OTHER | Age: 40
End: 2025-05-13
Attending: OBSTETRICS & GYNECOLOGY
Payer: COMMERCIAL

## 2025-05-13 ENCOUNTER — OFFICE VISIT (OUTPATIENT)
Dept: OBSTETRICS AND GYNECOLOGY | Facility: CLINIC | Age: 40
End: 2025-05-13
Payer: COMMERCIAL

## 2025-05-13 VITALS
BODY MASS INDEX: 25.7 KG/M2 | WEIGHT: 142.81 LBS | HEART RATE: 88 BPM | SYSTOLIC BLOOD PRESSURE: 111 MMHG | DIASTOLIC BLOOD PRESSURE: 74 MMHG

## 2025-05-13 DIAGNOSIS — N91.2 AMENORRHEA: Primary | ICD-10-CM

## 2025-05-13 DIAGNOSIS — N91.2 AMENORRHEA: ICD-10-CM

## 2025-05-13 DIAGNOSIS — Z30.09 COUNSELING FOR BIRTH CONTROL REGARDING INTRAUTERINE DEVICE (IUD): Primary | ICD-10-CM

## 2025-05-13 DIAGNOSIS — Z30.09 COUNSELING FOR BIRTH CONTROL REGARDING INTRAUTERINE DEVICE (IUD): ICD-10-CM

## 2025-05-13 DIAGNOSIS — N80.9 ENDOMETRIOSIS DETERMINED BY LAPAROSCOPY: ICD-10-CM

## 2025-05-13 LAB
ESTRADIOL SERPL HS-MCNC: 98 PG/ML
FSH SERPL-ACNC: 1.74 MIU/ML

## 2025-05-13 PROCEDURE — 99215 OFFICE O/P EST HI 40 MIN: CPT | Mod: S$GLB,,, | Performed by: OBSTETRICS & GYNECOLOGY

## 2025-05-13 PROCEDURE — 36415 COLL VENOUS BLD VENIPUNCTURE: CPT

## 2025-05-13 PROCEDURE — 3008F BODY MASS INDEX DOCD: CPT | Mod: CPTII,S$GLB,, | Performed by: OBSTETRICS & GYNECOLOGY

## 2025-05-13 PROCEDURE — 82670 ASSAY OF TOTAL ESTRADIOL: CPT

## 2025-05-13 PROCEDURE — 1159F MED LIST DOCD IN RCRD: CPT | Mod: CPTII,S$GLB,, | Performed by: OBSTETRICS & GYNECOLOGY

## 2025-05-13 PROCEDURE — 99999 PR PBB SHADOW E&M-EST. PATIENT-LVL III: CPT | Mod: PBBFAC,,, | Performed by: OBSTETRICS & GYNECOLOGY

## 2025-05-13 PROCEDURE — 83001 ASSAY OF GONADOTROPIN (FSH): CPT

## 2025-05-13 PROCEDURE — 3074F SYST BP LT 130 MM HG: CPT | Mod: CPTII,S$GLB,, | Performed by: OBSTETRICS & GYNECOLOGY

## 2025-05-13 PROCEDURE — 3078F DIAST BP <80 MM HG: CPT | Mod: CPTII,S$GLB,, | Performed by: OBSTETRICS & GYNECOLOGY

## 2025-05-13 RX ORDER — MISOPROSTOL 200 UG/1
200 TABLET ORAL EVERY 12 HOURS
Qty: 2 TABLET | Refills: 0 | Status: SHIPPED | OUTPATIENT
Start: 2025-05-13 | End: 2026-05-13

## 2025-05-13 NOTE — PROGRESS NOTES
[facility] Hormone Consult Preparation Sheet    Patient Name: Suad Prater 39 y.o. woman with stage 4 endometriosis with history of chronic pelvic pain.  Patient was previously on Myfembree but stopped approximately 4-5 weeks ago.  She has not had a menses and is currently amenorrheic.  She states she thinks that she ovulated 8-9 days ago due to symptoms of breast tenderness, bloating and change in vaginal discharge.  For the past 2-3 days she has been having abdominal and back pain.  She has moderate relief with NSAIDs, heating pain and muscle relaxer.  She states that it is different form the pain due to endometriosis.  She states since stopping MyFembree her libido ebbs and flows with ovulation.  She states that her vaginal dryness has improved as well but unsure if it because of the vaginal estrogen or discontinuing the Myfembree. Her sleep has been poor the last 2 weeks because she is moving to the [place] in a few weeks.  She wants to know her options to help control pelvic pain and endometriosis.  She was previously on OCPs but had better results when she had a progesterone only IUD.    Provider: Chinyere Grullon  CC and Symptoms:   Chief Complaint   Patient presents with    Follow-up     Hormone replacement therapy follow up       Patient History:  Problem List[1]    Patient Medications:  Current Outpatient Medications on File Prior to Visit   Medication Sig    AMZEEQ 4 % Foam as needed.    ARAZLO 0.045 % Lotn as needed.    estradioL (ESTRACE) 0.01 % (0.1 mg/gram) vaginal cream Place 1 g vaginally once daily. Insert 1 gram vaginally daily for 2 weeks, then insert 1 gram vaginally twice a week    multivitamin (THERAGRAN) per tablet Take 1 tablet by mouth once daily.    omega-3 fatty acids/fish oil (FISH OIL-OMEGA-3 FATTY ACIDS) 300-1,000 mg capsule Take 1 capsule by mouth once daily.    spironolactone (ALDACTONE) 100 MG tablet Take 100 mg by mouth once daily.    vitamin D  "(VITAMIN D3) 1000 units Tab Take 1,000 Units by mouth once daily.    lifitegrast (XIIDRA) 5 % Dpet Place 1 drop into both eyes 2 (two) times daily. (Patient not taking: Reported on 5/13/2025)    naproxen sodium (ANAPROX) 550 MG tablet Take 1 tablet (550 mg total) by mouth 2 (two) times daily with meals. (Patient taking differently: Take 550 mg by mouth as needed.)     No current facility-administered medications on file prior to visit.       Patient Imaging and Procedures  LMP: Patient's last menstrual period was 10/15/2024 (approximate).  PAP: 10/2/2024  HPV: No results found for: "HPV"  Mammo: 1/2025 WNL  Colonoscopy:    BMI: Body mass index is 25.7 kg/m².    Recent Labs:  Estradiol: No results found for: "ESTRADIOL"  Testosterone: No results found for: "TESTOSTERONE"  FSH: No results found for: "FSH"  CBC: No results found for: "WBC", "HGB", "HCT", "MCV", "PLT"    CMP:No results found for: "NA", "K", "CL", "CO2", "GLU", "BUN", "CREATININE", "CALCIUM", "PROT", "ALBUMIN", "BILITOT", "ALKPHOS", "AST", "ALT", "ANIONGAP", "ESTGFRAFRICA", "EGFRNONAA"  Lipids: No results found for: "CHOL", "TRIG", "HDL", "LDLCALC", "CHOLHDL", "TOTALCHOLEST", "NONHDLCHOL"  A1C: No results found for: "LABA1C", "HGBA1C"    Assessment & Plan   Amenorrhea  Chronic pelvic pain  Endometriosis  Discussed OCP v Liletta  Patient states that she was pain free with Liletta.  She states she did have 2-3 months of irregular bleeding  Discussed risk and benefits of each option and patient would like the IUD prior to moving  Explained insertion, risks, benefits  Will order today and insertion will ikely be schedule in the next week  Continue vaginal estrogen  All questions answered         [1]   Patient Active Problem List  Diagnosis    Endometriosis    IgA deficiency    Gastroesophageal reflux disease    Pain of left deltoid    Post-viral cough syndrome     "

## 2025-05-14 ENCOUNTER — PATIENT MESSAGE (OUTPATIENT)
Dept: OBSTETRICS AND GYNECOLOGY | Facility: CLINIC | Age: 40
End: 2025-05-14
Payer: COMMERCIAL

## 2025-05-14 ENCOUNTER — RESULTS FOLLOW-UP (OUTPATIENT)
Dept: OBSTETRICS AND GYNECOLOGY | Facility: CLINIC | Age: 40
End: 2025-05-14

## 2025-05-22 ENCOUNTER — PATIENT MESSAGE (OUTPATIENT)
Dept: OBSTETRICS AND GYNECOLOGY | Facility: CLINIC | Age: 40
End: 2025-05-22
Payer: COMMERCIAL

## 2025-05-22 ENCOUNTER — TELEPHONE (OUTPATIENT)
Dept: OBSTETRICS AND GYNECOLOGY | Facility: CLINIC | Age: 40
End: 2025-05-22
Payer: COMMERCIAL

## 2025-05-22 NOTE — TELEPHONE ENCOUNTER
----- Message from Valentine sent at 5/22/2025  8:45 AM CDT -----  Name of Who is Calling:DMITRY BETH [[id number]]  What is the request in detail: Pt calling to see if 5/27 is still available for her IUD insertion visit.Please call back to further assist.   [facility] the [facility] No  What Number to Call Back if not in [facility] [phone]